# Patient Record
Sex: FEMALE | Race: WHITE | NOT HISPANIC OR LATINO | URBAN - METROPOLITAN AREA
[De-identification: names, ages, dates, MRNs, and addresses within clinical notes are randomized per-mention and may not be internally consistent; named-entity substitution may affect disease eponyms.]

---

## 2020-04-18 ENCOUNTER — EMERGENCY (EMERGENCY)
Facility: HOSPITAL | Age: 80
LOS: 0 days | Discharge: HOME | End: 2020-04-18
Attending: EMERGENCY MEDICINE | Admitting: EMERGENCY MEDICINE
Payer: MEDICARE

## 2020-04-18 VITALS
RESPIRATION RATE: 20 BRPM | OXYGEN SATURATION: 99 % | DIASTOLIC BLOOD PRESSURE: 68 MMHG | HEART RATE: 80 BPM | SYSTOLIC BLOOD PRESSURE: 149 MMHG

## 2020-04-18 VITALS
HEART RATE: 126 BPM | RESPIRATION RATE: 20 BRPM | SYSTOLIC BLOOD PRESSURE: 188 MMHG | TEMPERATURE: 99 F | DIASTOLIC BLOOD PRESSURE: 79 MMHG | OXYGEN SATURATION: 99 %

## 2020-04-18 DIAGNOSIS — I10 ESSENTIAL (PRIMARY) HYPERTENSION: ICD-10-CM

## 2020-04-18 DIAGNOSIS — R68.83 CHILLS (WITHOUT FEVER): ICD-10-CM

## 2020-04-18 DIAGNOSIS — R19.7 DIARRHEA, UNSPECIFIED: ICD-10-CM

## 2020-04-18 DIAGNOSIS — E11.9 TYPE 2 DIABETES MELLITUS WITHOUT COMPLICATIONS: ICD-10-CM

## 2020-04-18 DIAGNOSIS — R55 SYNCOPE AND COLLAPSE: ICD-10-CM

## 2020-04-18 DIAGNOSIS — E78.5 HYPERLIPIDEMIA, UNSPECIFIED: ICD-10-CM

## 2020-04-18 DIAGNOSIS — R10.9 UNSPECIFIED ABDOMINAL PAIN: ICD-10-CM

## 2020-04-18 LAB
ALBUMIN SERPL ELPH-MCNC: 4.8 G/DL — SIGNIFICANT CHANGE UP (ref 3.5–5.2)
ALP SERPL-CCNC: 46 U/L — SIGNIFICANT CHANGE UP (ref 30–115)
ALT FLD-CCNC: 7 U/L — SIGNIFICANT CHANGE UP (ref 0–41)
ANION GAP SERPL CALC-SCNC: 16 MMOL/L — HIGH (ref 7–14)
APPEARANCE UR: CLEAR — SIGNIFICANT CHANGE UP
AST SERPL-CCNC: 12 U/L — SIGNIFICANT CHANGE UP (ref 0–41)
BACTERIA # UR AUTO: ABNORMAL
BASE EXCESS BLDV CALC-SCNC: -7 MMOL/L — LOW (ref -2–2)
BASOPHILS # BLD AUTO: 0.04 K/UL — SIGNIFICANT CHANGE UP (ref 0–0.2)
BASOPHILS NFR BLD AUTO: 0.6 % — SIGNIFICANT CHANGE UP (ref 0–1)
BILIRUB DIRECT SERPL-MCNC: <0.2 MG/DL — SIGNIFICANT CHANGE UP (ref 0–0.2)
BILIRUB INDIRECT FLD-MCNC: 0 MG/DL — SIGNIFICANT CHANGE UP (ref 0.2–1.2)
BILIRUB SERPL-MCNC: <0.2 MG/DL — SIGNIFICANT CHANGE UP (ref 0.2–1.2)
BILIRUB UR-MCNC: NEGATIVE — SIGNIFICANT CHANGE UP
BUN SERPL-MCNC: 36 MG/DL — HIGH (ref 10–20)
CA-I SERPL-SCNC: 1.3 MMOL/L — SIGNIFICANT CHANGE UP (ref 1.12–1.3)
CALCIUM SERPL-MCNC: 10 MG/DL — SIGNIFICANT CHANGE UP (ref 8.5–10.1)
CHLORIDE SERPL-SCNC: 107 MMOL/L — SIGNIFICANT CHANGE UP (ref 98–110)
CO2 SERPL-SCNC: 16 MMOL/L — LOW (ref 17–32)
COLOR SPEC: YELLOW — SIGNIFICANT CHANGE UP
CREAT SERPL-MCNC: 1.3 MG/DL — SIGNIFICANT CHANGE UP (ref 0.7–1.5)
DIFF PNL FLD: ABNORMAL
EOSINOPHIL # BLD AUTO: 0.06 K/UL — SIGNIFICANT CHANGE UP (ref 0–0.7)
EOSINOPHIL NFR BLD AUTO: 0.9 % — SIGNIFICANT CHANGE UP (ref 0–8)
GAS PNL BLDV: 139 MMOL/L — SIGNIFICANT CHANGE UP (ref 136–145)
GAS PNL BLDV: SIGNIFICANT CHANGE UP
GLUCOSE SERPL-MCNC: 189 MG/DL — HIGH (ref 70–99)
GLUCOSE UR QL: NEGATIVE MG/DL — SIGNIFICANT CHANGE UP
GRAN CASTS # UR COMP ASSIST: ABNORMAL /LPF
HCO3 BLDV-SCNC: 19 MMOL/L — LOW (ref 22–29)
HCT VFR BLD CALC: 34.6 % — LOW (ref 37–47)
HCT VFR BLDA CALC: 34.9 % — SIGNIFICANT CHANGE UP (ref 34–44)
HGB BLD CALC-MCNC: 11.4 G/DL — LOW (ref 14–18)
HGB BLD-MCNC: 11.2 G/DL — LOW (ref 12–16)
HOROWITZ INDEX BLDV+IHG-RTO: 21 — SIGNIFICANT CHANGE UP
IMM GRANULOCYTES NFR BLD AUTO: 0.4 % — HIGH (ref 0.1–0.3)
KETONES UR-MCNC: ABNORMAL
LACTATE BLDV-MCNC: 1 MMOL/L — SIGNIFICANT CHANGE UP (ref 0.5–1.6)
LACTATE SERPL-SCNC: 1 MMOL/L — SIGNIFICANT CHANGE UP (ref 0.7–2)
LEUKOCYTE ESTERASE UR-ACNC: ABNORMAL
LIDOCAIN IGE QN: 52 U/L — SIGNIFICANT CHANGE UP (ref 7–60)
LYMPHOCYTES # BLD AUTO: 1.68 K/UL — SIGNIFICANT CHANGE UP (ref 1.2–3.4)
LYMPHOCYTES # BLD AUTO: 24.7 % — SIGNIFICANT CHANGE UP (ref 20.5–51.1)
MCHC RBC-ENTMCNC: 30 PG — SIGNIFICANT CHANGE UP (ref 27–31)
MCHC RBC-ENTMCNC: 32.4 G/DL — SIGNIFICANT CHANGE UP (ref 32–37)
MCV RBC AUTO: 92.8 FL — SIGNIFICANT CHANGE UP (ref 81–99)
MONOCYTES # BLD AUTO: 0.4 K/UL — SIGNIFICANT CHANGE UP (ref 0.1–0.6)
MONOCYTES NFR BLD AUTO: 5.9 % — SIGNIFICANT CHANGE UP (ref 1.7–9.3)
NEUTROPHILS # BLD AUTO: 4.58 K/UL — SIGNIFICANT CHANGE UP (ref 1.4–6.5)
NEUTROPHILS NFR BLD AUTO: 67.5 % — SIGNIFICANT CHANGE UP (ref 42.2–75.2)
NITRITE UR-MCNC: NEGATIVE — SIGNIFICANT CHANGE UP
NRBC # BLD: 0 /100 WBCS — SIGNIFICANT CHANGE UP (ref 0–0)
NT-PROBNP SERPL-SCNC: 675 PG/ML — HIGH (ref 0–300)
PCO2 BLDV: 38 MMHG — LOW (ref 41–51)
PH BLDV: 7.31 — SIGNIFICANT CHANGE UP (ref 7.26–7.43)
PH UR: 5.5 — SIGNIFICANT CHANGE UP (ref 5–8)
PLATELET # BLD AUTO: 254 K/UL — SIGNIFICANT CHANGE UP (ref 130–400)
PO2 BLDV: 37 MMHG — SIGNIFICANT CHANGE UP (ref 20–40)
POTASSIUM BLDV-SCNC: 4.8 MMOL/L — SIGNIFICANT CHANGE UP (ref 3.3–5.6)
POTASSIUM SERPL-MCNC: 4.8 MMOL/L — SIGNIFICANT CHANGE UP (ref 3.5–5)
POTASSIUM SERPL-SCNC: 4.8 MMOL/L — SIGNIFICANT CHANGE UP (ref 3.5–5)
PROT SERPL-MCNC: 7.9 G/DL — SIGNIFICANT CHANGE UP (ref 6–8)
PROT UR-MCNC: 100 MG/DL
RBC # BLD: 3.73 M/UL — LOW (ref 4.2–5.4)
RBC # FLD: 15 % — HIGH (ref 11.5–14.5)
RBC CASTS # UR COMP ASSIST: ABNORMAL /HPF
SAO2 % BLDV: 70 % — SIGNIFICANT CHANGE UP
SODIUM SERPL-SCNC: 139 MMOL/L — SIGNIFICANT CHANGE UP (ref 135–146)
SP GR SPEC: 1.02 — SIGNIFICANT CHANGE UP (ref 1.01–1.03)
TROPONIN T SERPL-MCNC: <0.01 NG/ML — SIGNIFICANT CHANGE UP
TROPONIN T SERPL-MCNC: <0.01 NG/ML — SIGNIFICANT CHANGE UP
UROBILINOGEN FLD QL: 0.2 MG/DL — SIGNIFICANT CHANGE UP (ref 0.2–0.2)
WBC # BLD: 6.79 K/UL — SIGNIFICANT CHANGE UP (ref 4.8–10.8)
WBC # FLD AUTO: 6.79 K/UL — SIGNIFICANT CHANGE UP (ref 4.8–10.8)
WBC UR QL: ABNORMAL /HPF

## 2020-04-18 PROCEDURE — 99285 EMERGENCY DEPT VISIT HI MDM: CPT

## 2020-04-18 PROCEDURE — 71045 X-RAY EXAM CHEST 1 VIEW: CPT | Mod: 26

## 2020-04-18 RX ORDER — AMLODIPINE BESYLATE 2.5 MG/1
5 TABLET ORAL ONCE
Refills: 0 | Status: COMPLETED | OUTPATIENT
Start: 2020-04-18 | End: 2020-04-18

## 2020-04-18 RX ORDER — LISINOPRIL 2.5 MG/1
5 TABLET ORAL ONCE
Refills: 0 | Status: COMPLETED | OUTPATIENT
Start: 2020-04-18 | End: 2020-04-18

## 2020-04-18 RX ORDER — METOPROLOL TARTRATE 50 MG
50 TABLET ORAL ONCE
Refills: 0 | Status: COMPLETED | OUTPATIENT
Start: 2020-04-18 | End: 2020-04-18

## 2020-04-18 RX ADMIN — AMLODIPINE BESYLATE 5 MILLIGRAM(S): 2.5 TABLET ORAL at 08:15

## 2020-04-18 RX ADMIN — LISINOPRIL 5 MILLIGRAM(S): 2.5 TABLET ORAL at 08:15

## 2020-04-18 RX ADMIN — Medication 50 MILLIGRAM(S): at 08:16

## 2020-04-18 NOTE — ED PROVIDER NOTE - NSFOLLOWUPINSTRUCTIONS_ED_ALL_ED_FT
-Follow up with your Primary Care Provider in 1-3 days  -Return to ED for worsening symptoms or concerns.    Hypertension  Hypertension, commonly called high blood pressure, is when the force of blood pumping through the arteries is too strong. The arteries are the blood vessels that carry blood from the heart throughout the body. Hypertension forces the heart to work harder to pump blood and may cause arteries to become narrow or stiff. Having untreated or uncontrolled hypertension can cause heart attacks, strokes, kidney disease, and other problems.    A blood pressure reading consists of a higher number over a lower number. Ideally, your blood pressure should be below 120/80. The first ("top") number is called the systolic pressure. It is a measure of the pressure in your arteries as your heart beats. The second ("bottom") number is called the diastolic pressure. It is a measure of the pressure in your arteries as the heart relaxes.    What are the causes?  The cause of this condition is not known.    What increases the risk?  Some risk factors for high blood pressure are under your control. Others are not.    Factors you can change     Smoking.  Having type 2 diabetes mellitus, high cholesterol, or both.  Not getting enough exercise or physical activity.  Being overweight.  Having too much fat, sugar, calories, or salt (sodium) in your diet.  Drinking too much alcohol.  Factors that are difficult or impossible to change     Having chronic kidney disease.  Having a family history of high blood pressure.  Age. Risk increases with age.  Race. You may be at higher risk if you are -American.  Gender. Men are at higher risk than women before age 45. After age 65, women are at higher risk than men.  Having obstructive sleep apnea.  Stress.  What are the signs or symptoms?  Extremely high blood pressure (hypertensive crisis) may cause:    Headache.  Anxiety.  Shortness of breath.  Nosebleed.  Nausea and vomiting.  Severe chest pain.  Jerky movements you cannot control (seizures).    How is this diagnosed?  This condition is diagnosed by measuring your blood pressure while you are seated, with your arm resting on a surface. The cuff of the blood pressure monitor will be placed directly against the skin of your upper arm at the level of your heart. It should be measured at least twice using the same arm. Certain conditions can cause a difference in blood pressure between your right and left arms.    Certain factors can cause blood pressure readings to be lower or higher than normal (elevated) for a short period of time:    When your blood pressure is higher when you are in a health care provider's office than when you are at home, this is called white coat hypertension. Most people with this condition do not need medicines.  When your blood pressure is higher at home than when you are in a health care provider's office, this is called masked hypertension. Most people with this condition may need medicines to control blood pressure.    If you have a high blood pressure reading during one visit or you have normal blood pressure with other risk factors:    You may be asked to return on a different day to have your blood pressure checked again.  You may be asked to monitor your blood pressure at home for 1 week or longer.    If you are diagnosed with hypertension, you may have other blood or imaging tests to help your health care provider understand your overall risk for other conditions.    How is this treated?  This condition is treated by making healthy lifestyle changes, such as eating healthy foods, exercising more, and reducing your alcohol intake. Your health care provider may prescribe medicine if lifestyle changes are not enough to get your blood pressure under control, and if:    Your systolic blood pressure is above 130.  Your diastolic blood pressure is above 80.    Your personal target blood pressure may vary depending on your medical conditions, your age, and other factors.    Follow these instructions at home:  Eating and drinking     Eat a diet that is high in fiber and potassium, and low in sodium, added sugar, and fat. An example eating plan is called the DASH (Dietary Approaches to Stop Hypertension) diet. To eat this way:    Eat plenty of fresh fruits and vegetables. Try to fill half of your plate at each meal with fruits and vegetables.  Eat whole grains, such as whole wheat pasta, brown rice, or whole grain bread. Fill about one quarter of your plate with whole grains.  Eat or drink low-fat dairy products, such as skim milk or low-fat yogurt.  Avoid fatty cuts of meat, processed or cured meats, and poultry with skin. Fill about one quarter of your plate with lean proteins, such as fish, chicken without skin, beans, eggs, and tofu.  Avoid premade and processed foods. These tend to be higher in sodium, added sugar, and fat.    Reduce your daily sodium intake. Most people with hypertension should eat less than 1,500 mg of sodium a day.  ImageLimit alcohol intake to no more than 1 drink a day for nonpregnant women and 2 drinks a day for men. One drink equals 12 oz of beer, 5 oz of wine, or 1½ oz of hard liquor.  Lifestyle     Work with your health care provider to maintain a healthy body weight or to lose weight. Ask what an ideal weight is for you.  Get at least 30 minutes of exercise that causes your heart to beat faster (aerobic exercise) most days of the week. Activities may include walking, swimming, or biking.  Include exercise to strengthen your muscles (resistance exercise), such as pilates or lifting weights, as part of your weekly exercise routine. Try to do these types of exercises for 30 minutes at least 3 days a week.  Do not use any products that contain nicotine or tobacco, such as cigarettes and e-cigarettes. If you need help quitting, ask your health care provider.  Monitor your blood pressure at home as told by your health care provider.  Keep all follow-up visits as told by your health care provider. This is important.  Medicines     Take over-the-counter and prescription medicines only as told by your health care provider. Follow directions carefully. Blood pressure medicines must be taken as prescribed.  Do not skip doses of blood pressure medicine. Doing this puts you at risk for problems and can make the medicine less effective.  Ask your health care provider about side effects or reactions to medicines that you should watch for.  Contact a health care provider if:  You think you are having a reaction to a medicine you are taking.  You have headaches that keep coming back (recurring).  You feel dizzy.  You have swelling in your ankles.  You have trouble with your vision.  Get help right away if:  You develop a severe headache or confusion.  You have unusual weakness or numbness.  You feel faint.  You have severe pain in your chest or abdomen.  You vomit repeatedly.  You have trouble breathing.  Summary  Hypertension is when the force of blood pumping through your arteries is too strong. If this condition is not controlled, it may put you at risk for serious complications.  Your personal target blood pressure may vary depending on your medical conditions, your age, and other factors. For most people, a normal blood pressure is less than 120/80.  Hypertension is treated with lifestyle changes, medicines, or a combination of both. Lifestyle changes include weight loss, eating a healthy, low-sodium diet, exercising more, and limiting alcohol.  This information is not intended to replace advice given to you by your health care provider. Make sure you discuss any questions you have with your health care provider.

## 2020-04-18 NOTE — ED PROVIDER NOTE - PROGRESS NOTE DETAILS
patient feeling better- states she is no longer having her abd pain, tolerating PO. she states she has no symptoms and was mainly coming in with concern for her bp being high.

## 2020-04-18 NOTE — ED ADULT NURSE NOTE - NSIMPLEMENTINTERV_GEN_ALL_ED
Implemented All Universal Safety Interventions:  [place] to call system. Call bell, personal items and telephone within reach. Instruct patient to call for assistance. Room bathroom lighting operational. Non-slip footwear when patient is off stretcher. Physically safe environment: no spills, clutter or unnecessary equipment. Stretcher in lowest position, wheels locked, appropriate side rails in place.

## 2020-04-18 NOTE — ED PROVIDER NOTE - ATTENDING CONTRIBUTION TO CARE
80y female above PMH with epigastric discomfort/diaphoresis, 1 week ago had nbnb vomiting and watery nb diarrhea with syncopal episode, did not seek care at that time, today denies cp, cough, or sob, did not take morning meds, on exam vital signs appreciated, well appearing, head nc/at, perrla, EOMI, conj pink op clear neck supple cor rrr lungs cta abd +bs, snt/nd no c/c/e pulses equal calves nontender neuro intact, will check ekg, labs, imaging, give morning meds and small fluid bolus, reassess

## 2020-04-18 NOTE — ED PROVIDER NOTE - PATIENT PORTAL LINK FT
You can access the FollowMyHealth Patient Portal offered by [facility] [place] by registering at the following website: [email or web address]. By joining [facility]’s FollowMyHealth portal, you will also be able to view your health information using other applications (apps) compatible with our system.

## 2020-04-18 NOTE — ED PROVIDER NOTE - OBJECTIVE STATEMENT
80 year old male hx HTN, HLD, DM presenting with abd pain. pain epigastric, non-radiating, no pall/prov factors. Son states patient had 1 episode of vomiting/diarrhea/syncope 7 days ago but has been feeling fine since then until last night. Admits to diaphoresis, chills but no measured temp, chest pain, shortness of breath, n/v/d, dysuria. No hx CAD.

## 2020-04-18 NOTE — ED PROVIDER NOTE - CARE PLAN
Principal Discharge DX:	Encounter for medical assessment  Secondary Diagnosis:	Chills  Secondary Diagnosis:	Asymptomatic hypertension

## 2020-04-18 NOTE — ED PROVIDER NOTE - CLINICAL SUMMARY MEDICAL DECISION MAKING FREE TEXT BOX
80y female above PMH with epigastric discomfort/diaphoresis, 1 week ago had nbnb vomiting and watery nb diarrhea with syncopal episode, did not seek care at that time, today denies cp, cough, or sob, did not take morning meds, on exam vital signs appreciated, well appearing, head nc/at, perrla, EOMI, conj pink op clear neck supple cor rrr lungs cta abd +bs, snt/nd no c/c/e pulses equal calves nontender neuro intact, labs and studies reviewed, pt feels well, vss, will d/c to f/u with pmd. Patient and son counseled regarding conditions which should prompt return.

## 2020-04-19 LAB
CULTURE RESULTS: SIGNIFICANT CHANGE UP
SPECIMEN SOURCE: SIGNIFICANT CHANGE UP

## 2023-08-07 ENCOUNTER — EMERGENCY (EMERGENCY)
Facility: HOSPITAL | Age: 83
LOS: 0 days | Discharge: HOME | End: 2023-08-07
Attending: EMERGENCY MEDICINE
Payer: MEDICARE

## 2023-08-07 VITALS
DIASTOLIC BLOOD PRESSURE: 65 MMHG | WEIGHT: 117.07 LBS | OXYGEN SATURATION: 96 % | SYSTOLIC BLOOD PRESSURE: 146 MMHG | TEMPERATURE: 98 F | HEART RATE: 80 BPM | RESPIRATION RATE: 20 BRPM

## 2023-08-07 VITALS
HEART RATE: 74 BPM | OXYGEN SATURATION: 96 % | SYSTOLIC BLOOD PRESSURE: 138 MMHG | TEMPERATURE: 98 F | RESPIRATION RATE: 18 BRPM | DIASTOLIC BLOOD PRESSURE: 76 MMHG

## 2023-08-07 DIAGNOSIS — E11.9 TYPE 2 DIABETES MELLITUS WITHOUT COMPLICATIONS: ICD-10-CM

## 2023-08-07 DIAGNOSIS — I10 ESSENTIAL (PRIMARY) HYPERTENSION: ICD-10-CM

## 2023-08-07 DIAGNOSIS — E78.5 HYPERLIPIDEMIA, UNSPECIFIED: ICD-10-CM

## 2023-08-07 DIAGNOSIS — D64.9 ANEMIA, UNSPECIFIED: ICD-10-CM

## 2023-08-07 LAB
ALBUMIN SERPL ELPH-MCNC: 4.6 G/DL — SIGNIFICANT CHANGE UP (ref 3.5–5.2)
ALP SERPL-CCNC: 97 U/L — SIGNIFICANT CHANGE UP (ref 30–115)
ALT FLD-CCNC: 12 U/L — SIGNIFICANT CHANGE UP (ref 0–41)
ANION GAP SERPL CALC-SCNC: 13 MMOL/L — SIGNIFICANT CHANGE UP (ref 7–14)
AST SERPL-CCNC: 14 U/L — SIGNIFICANT CHANGE UP (ref 0–41)
BASOPHILS # BLD AUTO: 0.06 K/UL — SIGNIFICANT CHANGE UP (ref 0–0.2)
BASOPHILS NFR BLD AUTO: 1.3 % — HIGH (ref 0–1)
BILIRUB SERPL-MCNC: <0.2 MG/DL — SIGNIFICANT CHANGE UP (ref 0.2–1.2)
BLD GP AB SCN SERPL QL: SIGNIFICANT CHANGE UP
BUN SERPL-MCNC: 49 MG/DL — HIGH (ref 10–20)
CALCIUM SERPL-MCNC: 9 MG/DL — SIGNIFICANT CHANGE UP (ref 8.4–10.5)
CHLORIDE SERPL-SCNC: 104 MMOL/L — SIGNIFICANT CHANGE UP (ref 98–110)
CO2 SERPL-SCNC: 20 MMOL/L — SIGNIFICANT CHANGE UP (ref 17–32)
CREAT SERPL-MCNC: 2.1 MG/DL — HIGH (ref 0.7–1.5)
EGFR: 23 ML/MIN/1.73M2 — LOW
EOSINOPHIL # BLD AUTO: 0.23 K/UL — SIGNIFICANT CHANGE UP (ref 0–0.7)
EOSINOPHIL NFR BLD AUTO: 4.8 % — SIGNIFICANT CHANGE UP (ref 0–8)
GLUCOSE SERPL-MCNC: 191 MG/DL — HIGH (ref 70–99)
HCT VFR BLD CALC: 29.4 % — LOW (ref 37–47)
HGB BLD-MCNC: 8.8 G/DL — LOW (ref 12–16)
IMM GRANULOCYTES NFR BLD AUTO: 0.2 % — SIGNIFICANT CHANGE UP (ref 0.1–0.3)
LYMPHOCYTES # BLD AUTO: 1.4 K/UL — SIGNIFICANT CHANGE UP (ref 1.2–3.4)
LYMPHOCYTES # BLD AUTO: 29.3 % — SIGNIFICANT CHANGE UP (ref 20.5–51.1)
MCHC RBC-ENTMCNC: 25.8 PG — LOW (ref 27–31)
MCHC RBC-ENTMCNC: 29.9 G/DL — LOW (ref 32–37)
MCV RBC AUTO: 86.2 FL — SIGNIFICANT CHANGE UP (ref 81–99)
MONOCYTES # BLD AUTO: 0.6 K/UL — SIGNIFICANT CHANGE UP (ref 0.1–0.6)
MONOCYTES NFR BLD AUTO: 12.6 % — HIGH (ref 1.7–9.3)
NEUTROPHILS # BLD AUTO: 2.48 K/UL — SIGNIFICANT CHANGE UP (ref 1.4–6.5)
NEUTROPHILS NFR BLD AUTO: 51.8 % — SIGNIFICANT CHANGE UP (ref 42.2–75.2)
NRBC # BLD: 0 /100 WBCS — SIGNIFICANT CHANGE UP (ref 0–0)
PLATELET # BLD AUTO: 337 K/UL — SIGNIFICANT CHANGE UP (ref 130–400)
PMV BLD: 9.4 FL — SIGNIFICANT CHANGE UP (ref 7.4–10.4)
POTASSIUM SERPL-MCNC: 5.2 MMOL/L — HIGH (ref 3.5–5)
POTASSIUM SERPL-SCNC: 5.2 MMOL/L — HIGH (ref 3.5–5)
PROT SERPL-MCNC: 8.1 G/DL — HIGH (ref 6–8)
RBC # BLD: 3.41 M/UL — LOW (ref 4.2–5.4)
RBC # FLD: 15.1 % — HIGH (ref 11.5–14.5)
SODIUM SERPL-SCNC: 137 MMOL/L — SIGNIFICANT CHANGE UP (ref 135–146)
WBC # BLD: 4.78 K/UL — LOW (ref 4.8–10.8)
WBC # FLD AUTO: 4.78 K/UL — LOW (ref 4.8–10.8)

## 2023-08-07 PROCEDURE — 80053 COMPREHEN METABOLIC PANEL: CPT

## 2023-08-07 PROCEDURE — 85025 COMPLETE CBC W/AUTO DIFF WBC: CPT

## 2023-08-07 PROCEDURE — 36415 COLL VENOUS BLD VENIPUNCTURE: CPT

## 2023-08-07 PROCEDURE — 86901 BLOOD TYPING SEROLOGIC RH(D): CPT

## 2023-08-07 PROCEDURE — 99283 EMERGENCY DEPT VISIT LOW MDM: CPT

## 2023-08-07 PROCEDURE — 86850 RBC ANTIBODY SCREEN: CPT

## 2023-08-07 PROCEDURE — 99284 EMERGENCY DEPT VISIT MOD MDM: CPT | Mod: FS

## 2023-08-07 PROCEDURE — 86900 BLOOD TYPING SEROLOGIC ABO: CPT

## 2023-08-07 NOTE — ED ADULT NURSE NOTE - DRUG PRE-SCREENING (DAST -1)
Diabetes Support Resources:  Fill reservoir to 1.50 ml  Change out your site 3 days  Place the G5 sensor on    I will call you with the G6 sensor     Bring blood glucose meter and logbook with you to all doctor and follow-up appointments.    Diabetes Education Telephone Visit Follow-up:    We realize your time is valuable and your health is important! We offer a convenient Telephone Visit follow up! It s a quick way to check in for a medication dose adjustment without having to come back to clinic as soon.    Telephone Visits are often covered by insurance. Please check with your insurance plan to see if this type of visit is covered. If not, the cost is less expensive than an office visit:      Up to 10 minutes (Code 29007): $30    11-20 minutes (Code 59276): $59    More than 20 minutes (Code 30442): $85    Talk with your Diabetes Educator if you want to learn more.      [facility]:  For Your Diabetes Education and Nutrition Appointments Call:  [phone]   For Diabetes Education or Nutrition Related Questions:   Phone: [phone]  E-mail: [email or web address]  Fax: [phone]   If you need a medication refill please contact your pharmacy. Please allow 3 business days for your refills to be completed.    
Statement Selected

## 2023-08-07 NOTE — ED PROVIDER NOTE - OBJECTIVE STATEMENT
this is a 84 yo female presents to ed for possible blood transfusion, patient has been diagnosed with anemia. patient was going for infusions. patient went today for infusion and was told to come to ed because blood count was 6.4

## 2023-08-07 NOTE — ED PROVIDER NOTE - PATIENT PORTAL LINK FT
You can access the FollowMyHealth Patient Portal offered by [facility] by registering at the following website: [email or web address]. By joining [facility]’s FollowMyHealth portal, you will also be able to view your health information using other applications (apps) compatible with our system.

## 2023-08-07 NOTE — ED ADULT NURSE NOTE - NSFALLHARMRISKINTERV_ED_ALL_ED

## 2023-08-07 NOTE — ED PROVIDER NOTE - CLINICAL SUMMARY MEDICAL DECISION MAKING FREE TEXT BOX
83-year-old female with anemia presented for some sort of infusion but was found with low hemoglobin on point-of-care test in office and sent to ED for transfusion, patient has no complaints, exam as above, hemoglobin 8.8 which is baseline, will discharge

## 2023-08-07 NOTE — ED PROVIDER NOTE - NS ED ATTENDING STATEMENT MOD
This was a shared visit with the VEDA. I reviewed and verified the documentation and independently performed the documented:

## 2023-08-08 PROBLEM — E11.9 TYPE 2 DIABETES MELLITUS WITHOUT COMPLICATIONS: Chronic | Status: ACTIVE | Noted: 2020-04-18

## 2023-08-08 PROBLEM — I10 ESSENTIAL (PRIMARY) HYPERTENSION: Chronic | Status: ACTIVE | Noted: 2020-04-18

## 2023-08-08 PROBLEM — E78.5 HYPERLIPIDEMIA, UNSPECIFIED: Chronic | Status: ACTIVE | Noted: 2020-04-18

## 2024-03-09 ENCOUNTER — INPATIENT (INPATIENT)
Facility: HOSPITAL | Age: 84
LOS: 2 days | Discharge: HOME | DRG: 313 | End: 2024-03-12
Attending: HOSPITALIST | Admitting: INTERNAL MEDICINE
Payer: MEDICARE

## 2024-03-09 VITALS
SYSTOLIC BLOOD PRESSURE: 188 MMHG | TEMPERATURE: 98 F | DIASTOLIC BLOOD PRESSURE: 66 MMHG | OXYGEN SATURATION: 96 % | HEART RATE: 108 BPM | HEIGHT: 62 IN | RESPIRATION RATE: 18 BRPM | WEIGHT: 117.95 LBS

## 2024-03-09 DIAGNOSIS — R07.9 CHEST PAIN, UNSPECIFIED: ICD-10-CM

## 2024-03-09 LAB
ALBUMIN SERPL ELPH-MCNC: 4.4 G/DL — SIGNIFICANT CHANGE UP (ref 3.5–5.2)
ALP SERPL-CCNC: 69 U/L — SIGNIFICANT CHANGE UP (ref 30–115)
ALT FLD-CCNC: 9 U/L — SIGNIFICANT CHANGE UP (ref 0–41)
ANION GAP SERPL CALC-SCNC: 13 MMOL/L — SIGNIFICANT CHANGE UP (ref 7–14)
APTT BLD: 31.8 SEC — SIGNIFICANT CHANGE UP (ref 27–39.2)
AST SERPL-CCNC: 17 U/L — SIGNIFICANT CHANGE UP (ref 0–41)
BASOPHILS # BLD AUTO: 0.04 K/UL — SIGNIFICANT CHANGE UP (ref 0–0.2)
BASOPHILS NFR BLD AUTO: 0.4 % — SIGNIFICANT CHANGE UP (ref 0–1)
BILIRUB SERPL-MCNC: 0.2 MG/DL — SIGNIFICANT CHANGE UP (ref 0.2–1.2)
BUN SERPL-MCNC: 54 MG/DL — HIGH (ref 10–20)
CALCIUM SERPL-MCNC: 9.3 MG/DL — SIGNIFICANT CHANGE UP (ref 8.4–10.5)
CHLORIDE SERPL-SCNC: 102 MMOL/L — SIGNIFICANT CHANGE UP (ref 98–110)
CO2 SERPL-SCNC: 22 MMOL/L — SIGNIFICANT CHANGE UP (ref 17–32)
CREAT SERPL-MCNC: 2.4 MG/DL — HIGH (ref 0.7–1.5)
EGFR: 19 ML/MIN/1.73M2 — LOW
EOSINOPHIL # BLD AUTO: 0.06 K/UL — SIGNIFICANT CHANGE UP (ref 0–0.7)
EOSINOPHIL NFR BLD AUTO: 0.6 % — SIGNIFICANT CHANGE UP (ref 0–8)
FLUAV AG NPH QL: SIGNIFICANT CHANGE UP
FLUBV AG NPH QL: SIGNIFICANT CHANGE UP
GLUCOSE BLDC GLUCOMTR-MCNC: 153 MG/DL — HIGH (ref 70–99)
GLUCOSE BLDC GLUCOMTR-MCNC: 176 MG/DL — HIGH (ref 70–99)
GLUCOSE BLDC GLUCOMTR-MCNC: 181 MG/DL — HIGH (ref 70–99)
GLUCOSE SERPL-MCNC: 138 MG/DL — HIGH (ref 70–99)
HCT VFR BLD CALC: 32 % — LOW (ref 37–47)
HGB BLD-MCNC: 10.8 G/DL — LOW (ref 12–16)
IMM GRANULOCYTES NFR BLD AUTO: 0.3 % — SIGNIFICANT CHANGE UP (ref 0.1–0.3)
INR BLD: 0.93 RATIO — SIGNIFICANT CHANGE UP (ref 0.65–1.3)
LYMPHOCYTES # BLD AUTO: 1.02 K/UL — LOW (ref 1.2–3.4)
LYMPHOCYTES # BLD AUTO: 10 % — LOW (ref 20.5–51.1)
MCHC RBC-ENTMCNC: 29.3 PG — SIGNIFICANT CHANGE UP (ref 27–31)
MCHC RBC-ENTMCNC: 33.8 G/DL — SIGNIFICANT CHANGE UP (ref 32–37)
MCV RBC AUTO: 87 FL — SIGNIFICANT CHANGE UP (ref 81–99)
MONOCYTES # BLD AUTO: 1.08 K/UL — HIGH (ref 0.1–0.6)
MONOCYTES NFR BLD AUTO: 10.6 % — HIGH (ref 1.7–9.3)
NEUTROPHILS # BLD AUTO: 7.95 K/UL — HIGH (ref 1.4–6.5)
NEUTROPHILS NFR BLD AUTO: 78.1 % — HIGH (ref 42.2–75.2)
NRBC # BLD: 0 /100 WBCS — SIGNIFICANT CHANGE UP (ref 0–0)
NT-PROBNP SERPL-SCNC: 6913 PG/ML — HIGH (ref 0–300)
PLATELET # BLD AUTO: 210 K/UL — SIGNIFICANT CHANGE UP (ref 130–400)
PMV BLD: 10 FL — SIGNIFICANT CHANGE UP (ref 7.4–10.4)
POTASSIUM SERPL-MCNC: 4.8 MMOL/L — SIGNIFICANT CHANGE UP (ref 3.5–5)
POTASSIUM SERPL-SCNC: 4.8 MMOL/L — SIGNIFICANT CHANGE UP (ref 3.5–5)
PROT SERPL-MCNC: 7.9 G/DL — SIGNIFICANT CHANGE UP (ref 6–8)
PROTHROM AB SERPL-ACNC: 10.6 SEC — SIGNIFICANT CHANGE UP (ref 9.95–12.87)
RBC # BLD: 3.68 M/UL — LOW (ref 4.2–5.4)
RBC # FLD: 17.2 % — HIGH (ref 11.5–14.5)
RSV RNA NPH QL NAA+NON-PROBE: SIGNIFICANT CHANGE UP
SARS-COV-2 RNA SPEC QL NAA+PROBE: SIGNIFICANT CHANGE UP
SODIUM SERPL-SCNC: 137 MMOL/L — SIGNIFICANT CHANGE UP (ref 135–146)
TROPONIN SAMPLING TIME: SIGNIFICANT CHANGE UP
TROPONIN T, HIGH SENSITIVITY RESULT: 36 NG/L — HIGH (ref 6–13)
TROPONIN T, HIGH SENSITIVITY RESULT: 36 NG/L — HIGH (ref 6–13)
TROPONIN T, HIGH SENSITIVITY RESULT: 40 NG/L — HIGH (ref 6–13)
TROPONIN T, HIGH SENSITIVITY RESULT: 45 NG/L — HIGH (ref 6–13)
WBC # BLD: 10.18 K/UL — SIGNIFICANT CHANGE UP (ref 4.8–10.8)
WBC # FLD AUTO: 10.18 K/UL — SIGNIFICANT CHANGE UP (ref 4.8–10.8)

## 2024-03-09 PROCEDURE — 78452 HT MUSCLE IMAGE SPECT MULT: CPT | Mod: MC

## 2024-03-09 PROCEDURE — 80061 LIPID PANEL: CPT

## 2024-03-09 PROCEDURE — 82728 ASSAY OF FERRITIN: CPT

## 2024-03-09 PROCEDURE — 85025 COMPLETE CBC W/AUTO DIFF WBC: CPT

## 2024-03-09 PROCEDURE — 84100 ASSAY OF PHOSPHORUS: CPT

## 2024-03-09 PROCEDURE — 83540 ASSAY OF IRON: CPT

## 2024-03-09 PROCEDURE — 71045 X-RAY EXAM CHEST 1 VIEW: CPT | Mod: 26

## 2024-03-09 PROCEDURE — 81001 URINALYSIS AUTO W/SCOPE: CPT

## 2024-03-09 PROCEDURE — 83550 IRON BINDING TEST: CPT

## 2024-03-09 PROCEDURE — 82962 GLUCOSE BLOOD TEST: CPT

## 2024-03-09 PROCEDURE — 83735 ASSAY OF MAGNESIUM: CPT

## 2024-03-09 PROCEDURE — 80048 BASIC METABOLIC PNL TOTAL CA: CPT

## 2024-03-09 PROCEDURE — 83036 HEMOGLOBIN GLYCOSYLATED A1C: CPT

## 2024-03-09 PROCEDURE — A9500: CPT

## 2024-03-09 PROCEDURE — 84484 ASSAY OF TROPONIN QUANT: CPT

## 2024-03-09 PROCEDURE — 99285 EMERGENCY DEPT VISIT HI MDM: CPT | Mod: FS

## 2024-03-09 PROCEDURE — 85027 COMPLETE CBC AUTOMATED: CPT

## 2024-03-09 PROCEDURE — 93306 TTE W/DOPPLER COMPLETE: CPT

## 2024-03-09 PROCEDURE — 36415 COLL VENOUS BLD VENIPUNCTURE: CPT

## 2024-03-09 RX ORDER — METOPROLOL TARTRATE 50 MG
50 TABLET ORAL
Refills: 0 | Status: DISCONTINUED | OUTPATIENT
Start: 2024-03-09 | End: 2024-03-12

## 2024-03-09 RX ORDER — ONDANSETRON 8 MG/1
4 TABLET, FILM COATED ORAL ONCE
Refills: 0 | Status: COMPLETED | OUTPATIENT
Start: 2024-03-09 | End: 2024-03-09

## 2024-03-09 RX ORDER — LANOLIN ALCOHOL/MO/W.PET/CERES
3 CREAM (GRAM) TOPICAL AT BEDTIME
Refills: 0 | Status: DISCONTINUED | OUTPATIENT
Start: 2024-03-09 | End: 2024-03-12

## 2024-03-09 RX ORDER — AMLODIPINE BESYLATE 2.5 MG/1
5 TABLET ORAL DAILY
Refills: 0 | Status: DISCONTINUED | OUTPATIENT
Start: 2024-03-09 | End: 2024-03-12

## 2024-03-09 RX ORDER — HEPARIN SODIUM 5000 [USP'U]/ML
5000 INJECTION INTRAVENOUS; SUBCUTANEOUS EVERY 8 HOURS
Refills: 0 | Status: DISCONTINUED | OUTPATIENT
Start: 2024-03-09 | End: 2024-03-12

## 2024-03-09 RX ORDER — INSULIN LISPRO 100/ML
VIAL (ML) SUBCUTANEOUS
Refills: 0 | Status: DISCONTINUED | OUTPATIENT
Start: 2024-03-09 | End: 2024-03-12

## 2024-03-09 RX ORDER — SODIUM BICARBONATE 1 MEQ/ML
650 SYRINGE (ML) INTRAVENOUS
Refills: 0 | Status: DISCONTINUED | OUTPATIENT
Start: 2024-03-09 | End: 2024-03-12

## 2024-03-09 RX ORDER — ONDANSETRON 8 MG/1
4 TABLET, FILM COATED ORAL EVERY 8 HOURS
Refills: 0 | Status: DISCONTINUED | OUTPATIENT
Start: 2024-03-09 | End: 2024-03-12

## 2024-03-09 RX ORDER — METOPROLOL TARTRATE 50 MG
1 TABLET ORAL
Refills: 0 | DISCHARGE

## 2024-03-09 RX ORDER — INSULIN GLARGINE 100 [IU]/ML
10 INJECTION, SOLUTION SUBCUTANEOUS AT BEDTIME
Refills: 0 | Status: DISCONTINUED | OUTPATIENT
Start: 2024-03-09 | End: 2024-03-12

## 2024-03-09 RX ORDER — INSULIN GLARGINE 100 [IU]/ML
10 INJECTION, SOLUTION SUBCUTANEOUS
Refills: 0 | DISCHARGE

## 2024-03-09 RX ORDER — SODIUM BICARBONATE 1 MEQ/ML
1 SYRINGE (ML) INTRAVENOUS
Refills: 0 | DISCHARGE

## 2024-03-09 RX ORDER — DOXAZOSIN MESYLATE 4 MG
1 TABLET ORAL DAILY
Refills: 0 | Status: DISCONTINUED | OUTPATIENT
Start: 2024-03-09 | End: 2024-03-12

## 2024-03-09 RX ORDER — ACETAMINOPHEN 500 MG
650 TABLET ORAL EVERY 6 HOURS
Refills: 0 | Status: DISCONTINUED | OUTPATIENT
Start: 2024-03-09 | End: 2024-03-12

## 2024-03-09 RX ORDER — ASPIRIN/CALCIUM CARB/MAGNESIUM 324 MG
324 TABLET ORAL ONCE
Refills: 0 | Status: COMPLETED | OUTPATIENT
Start: 2024-03-09 | End: 2024-03-09

## 2024-03-09 RX ORDER — SIMVASTATIN 20 MG/1
20 TABLET, FILM COATED ORAL AT BEDTIME
Refills: 0 | Status: DISCONTINUED | OUTPATIENT
Start: 2024-03-09 | End: 2024-03-12

## 2024-03-09 RX ADMIN — Medication 1: at 17:08

## 2024-03-09 RX ADMIN — AMLODIPINE BESYLATE 5 MILLIGRAM(S): 2.5 TABLET ORAL at 12:03

## 2024-03-09 RX ADMIN — Medication 650 MILLIGRAM(S): at 17:45

## 2024-03-09 RX ADMIN — INSULIN GLARGINE 10 UNIT(S): 100 INJECTION, SOLUTION SUBCUTANEOUS at 21:09

## 2024-03-09 RX ADMIN — HEPARIN SODIUM 5000 UNIT(S): 5000 INJECTION INTRAVENOUS; SUBCUTANEOUS at 21:08

## 2024-03-09 RX ADMIN — ONDANSETRON 4 MILLIGRAM(S): 8 TABLET, FILM COATED ORAL at 07:50

## 2024-03-09 RX ADMIN — Medication 50 MILLIGRAM(S): at 17:45

## 2024-03-09 RX ADMIN — Medication 324 MILLIGRAM(S): at 07:51

## 2024-03-09 RX ADMIN — HEPARIN SODIUM 5000 UNIT(S): 5000 INJECTION INTRAVENOUS; SUBCUTANEOUS at 17:09

## 2024-03-09 RX ADMIN — SIMVASTATIN 20 MILLIGRAM(S): 20 TABLET, FILM COATED ORAL at 21:09

## 2024-03-09 NOTE — H&P ADULT - NSHPLABSRESULTS_GEN_ALL_CORE
10.8   10.18 )-----------( 210      ( 09 Mar 2024 07:40 )             32.0       03-09    137  |  102  |  54<H>  ----------------------------<  138<H>  4.8   |  22  |  2.4<H>    Ca    9.3      09 Mar 2024 07:40    TPro  7.9  /  Alb  4.4  /  TBili  0.2  /  DBili  x   /  AST  17  /  ALT  9   /  AlkPhos  69  03-09              Urinalysis Basic - ( 09 Mar 2024 07:40 )    Color: x / Appearance: x / SG: x / pH: x  Gluc: 138 mg/dL / Ketone: x  / Bili: x / Urobili: x   Blood: x / Protein: x / Nitrite: x   Leuk Esterase: x / RBC: x / WBC x   Sq Epi: x / Non Sq Epi: x / Bacteria: x        PT/INR - ( 09 Mar 2024 07:40 )   PT: 10.60 sec;   INR: 0.93 ratio         PTT - ( 09 Mar 2024 07:40 )  PTT:31.8 sec      EKG 3/9:   Ventricular Rate 95 BPM    Atrial Rate 95 BPM    P-R Interval 164 ms    QRS Duration 80 ms    Q-T Interval 332 ms    QTC Calculation(Bazett) 417 ms    P Axis 76 degrees    R Axis 9 degrees    T Axis 61 degrees    Diagnosis Line Normal sinus rhythm  Normal ECG  Confirmed by DRE MORRIS, ASA ([id number]) on 3/9/2024 9:08:56 AM    CXR 3/9: Pending official read

## 2024-03-09 NOTE — H&P ADULT - HISTORY OF PRESENT ILLNESS
This is an 83 yo female with PMHx of HTN, HLD, DM presenting to the ED for chest pain. Patient's son reports pain started at midnight in left shoulder, describes pain is persistent, aching, nonradiating. Patient admits to associated SOB and orthopnea, and states pain is alleviated when sitting/standing up.  Patient also reports mild dull HA, 5/10. Patient currently reports chest pain has significantly improved.  Denies numbness/tingling, fever, chills, abdominal pain, dysuria, V/D, palpitations, lightheadedness, dizziness, weakness at this time.  This is an 83 yo female with PMHx of HTN, HLD, DM, CKD4 presenting to the ED for chest pain. Patient's son reports pain started at midnight in left shoulder, describes pain is persistent, aching, nonradiating. Patient admits to associated SOB and orthopnea, and states pain is alleviated when sitting/standing up.  Patient also reports mild dull HA, 5/10. Patient currently reports chest pain has significantly improved.  Denies numbness/tingling, fever, chills, abdominal pain, dysuria, V/D, palpitations, lightheadedness, dizziness, weakness at this time.

## 2024-03-09 NOTE — CONSULT NOTE ADULT - NS ATTEND AMEND GEN_ALL_CORE FT
This is an 83 yo female with PMHx of HTN, HLD, DM, CKD4 presenting to the ED for chest pain. Pain at rest. No exertional pain. No pain now. Will do a Lexiscan

## 2024-03-09 NOTE — ED PROVIDER NOTE - CLINICAL SUMMARY MEDICAL DECISION MAKING FREE TEXT BOX
Pt presents with chest pain. Associated with shortness of breath. While in the ED pt felt better. Troponin is elevated. Admitted to Tele for ACS.

## 2024-03-09 NOTE — H&P ADULT - NSHPOUTPATIENTPROVIDERS_GEN_ALL_CORE
Nephrologist: Dr.Namala Funez   PCP: Dr.Natalia Quiñones   Hematologist: Dr.Rumana Owens Nephrologist: Dr.Namala Funez   PCP: Dr.Natalya Farmer   Hematologist: Dr.Rumana Owens

## 2024-03-09 NOTE — H&P ADULT - ASSESSMENT
This is an 85 yo female with PMHx of HTN, HLD, DM presenting to the ED for chest pain.    Plan:   #Chest Pain- R/O ACS  -Admit to medicine   -s/p  x1 in ED   -EKG: NSR, no ischemic changes   -Trend tronopin 36>36   -F/U echo   -Supplemental oxygen if needed     #HTN/HLD  -C/w home amlodipine, simvastatin, metoprolol, cardura    #DM2  -ISS while inpatient  -home Basaglar 10 u qd   -holding home Pioglitazone     Above plan discussed with Dr. Callahan          This is an 85 yo female with PMHx of HTN, HLD, DM presenting to the ED for chest pain.    Plan:   #Chest Pain- R/O ACS  -Admit to medicine   -s/p  x1 in ED   -EKG: NSR, no ischemic changes   -Trend tronopin 36>36   -F/U echo   -Supplemental oxygen if needed     #HTN/HLD  -C/w home amlodipine, simvastatin, metoprolol, cardura    #DM2  -ISS while inpatient  -home Basaglar 10 u qd   -holding home Pioglitazone     #CKD  -c/w home sodium bicarb     #Diet:DASH  #Activity: Ambulate with assistance   #DVT ppx: HSQ     Above plan discussed with Dr. Callahan          This is an 83 yo female with PMHx of HTN, HLD, DM, CKD presenting to the ED for chest pain.    Plan:   #Chest Pain- R/O ACS  -Admit to medicine   -s/p  x1 in ED   -EKG: NSR, no ischemic changes   -Trend tronopin 36>36   -F/U echo   -F/U cardiology cs   -Supplemental oxygen prn   -Pain control prn     #HTN/HLD  -C/w home amlodipine, simvastatin, metoprolol, cardura    #DM2  -ISS while inpatient  -home Basaglar 10 u qd   -holding home Pioglitazone     #CKD4  -c/w sodium bicarb     #Diet:DASH  #Activity: OOB with assistance   #DVT ppx: HSQ     Above plan discussed with Dr. Callahan          This is an 85 yo female with PMHx of HTN, HLD, DM, CKD4 presenting to the ED for chest pain.    Plan:   #Chest Pain- R/O ACS  -Admit to medicine   -s/p  x1 in ED   -EKG: NSR, no ischemic changes   -Trend tronopin 36>36   -F/U echo   -F/U cardiology cs   -Supplemental oxygen prn   -Pain control prn     #HTN/HLD  -C/w home amlodipine, simvastatin, metoprolol, cardura    #DM2  -ISS while inpatient  -home Basaglar 10 u qd   -holding home Pioglitazone     #CKD4  -c/w sodium bicarb     #Diet:DASH  #Activity: OOB with assistance   #DVT ppx: HSQ     Above plan discussed with Dr. Callahan

## 2024-03-09 NOTE — ED ADULT NURSE NOTE - NSICDXPASTMEDICALHX_GEN_ALL_CORE_FT
PAST MEDICAL HISTORY:  DM (diabetes mellitus)     HLD (hyperlipidemia)     HTN (hypertension)     HTN (hypertension)

## 2024-03-09 NOTE — PATIENT PROFILE ADULT - FUNCTIONAL ASSESSMENT - BASIC MOBILITY 2.
Internal Medicine Daily Progress Note    Patient: Gabo Cruz 76 year old male Date: 2/22/2018   YOB: 1942 Attending: Julian Mireles MD     Chief Complaint: lightheadedness     Subjective:   Had thoracentesis with 1400cc removed yesterday. No dyspnea. No chest pain or palpations, no edema.     Review of Systems: A 12 point review of systems is otherwise negative    Medications :  Scheduled  • warfarin  4 mg Oral Once   • furosemide  40 mg Oral BID   • colchicine  0.6 mg Oral Daily   • WARFARIN - PHARMACIST MONITORED   Does not apply See Admin Instructions   • amiodarone  200 mg Oral 2 times per day   • atorvastatin  40 mg Oral Nightly   • aspirin  81 mg Oral Daily   • famotidine  20 mg Oral Daily   • potassium chloride  20 mEq Oral Daily   • sodium chloride (PF)  2 mL Injection 2 times per day   • enoxaparin (LOVENOX) injection  40 mg Subcutaneous Nightly   • pneumococcal 23-valent vaccine  0.5 mL Intramuscular Once       PRN  lidocaine, sodium chloride (PF), sodium chloride, nitroGLYcerin, potassium chloride, potassium chloride, potassium chloride, potassium chloride, potassium chloride, potassium chloride, sodium chloride, magnesium sulfate, magnesium sulfate, magnesium sulfate, acetaminophen    Continuous infusion      Allergies:  ALLERGIES:   Allergen Reactions   • Simvastatin PRURITUS     itching   • Codeine Other (See Comments)     Chest pain (cough medicine)   • Eliquis [Apixaban] DIZZINESS     lightheadedness       Physical Examination :  Vital 24 Hour Range Most Recent Value   Temperature Temp  Min: 97.2 °F (36.2 °C)  Max: 98.2 °F (36.8 °C) 97.4 °F (36.3 °C)   Pulse Pulse  Min: 81  Max: 104 95   Respiratory Resp  Min: 18  Max: 20 18   Blood Pressure BP  Min: 119/64  Max: 148/96 119/64   Pulse Oximetry SpO2  Min: 96 %  Max: 100 %    O2 No Data Recorded    Intake/Output:     Intake/Output Summary (Last 24 hours) at 02/22/18 1124  Last data filed at 02/22/18 1100   Gross per 24 hour    Intake             1200 ml   Output             2900 ml   Net            -1700 ml     Constitutional: NAD  HENT: MMM  Neck: Supple   Respiratory: diminished breath sounds at both bases. On RA  Chest: Sternotomy incision well healed.   Cardiovascular: RRR. No murmurs, rubs or gallops.   GI:  Soft, nondistended, normal bowel sounds, nontender  Musculoskeletal:  No tenderness nor deformities.  Skin:  No rash  Neurologic:  Alert & oriented x 3, no focal deficits noted   Extremities: No clubbing, cyanosis, nor edema    Laboratory Results:  Recent Labs      02/19/18   1130  02/20/18   0420  02/20/18   0940  02/21/18   0415  02/22/18   0415   WBC  9.6  6.8   --   7.8  7.2   RBC  3.67*  3.32*   --   3.31*  3.33*   HGB  11.2*  10.1*   --   9.9*  10.1*   HCT  36.3*  32.7*   --   32.5*  32.9*   PLT  243  220   --   214  222   PTT  29   --    --   30  32*   PT  15.1*   --   13.7*  16.0*  18.6*   INR  1.5   --   1.3  1.6  1.8   SODIUM  138  140   --   140  138   POTASSIUM  4.8  4.7   --   4.8  4.6   CHLORIDE  105  109*   --   108*  104   CO2  25  25   --   25  28   BUN  34*  28*   --   33*  33*   CREATININE  1.14  1.08   --   1.11  1.15   GFRNA  61  65   --   63  61   GLUCOSE  103*  86   --   99  93   CALCIUM  8.2*  7.9*   --   7.9*  8.2*   MG  2.3  2.4   --   2.5*  2.5*   AST  31  26   --    --    --    GPT  36  33   --    --    --    ALKPT  101  88   --    --    --    BILIRUBIN  0.6  0.6   --    --    --    ALBUMIN  2.6*  2.3*   --    --    --    RAPDTR  0.04   --    --    --    --        Imaging Results Reviewed:  No results found.    Assessment and Plan:  1. Recurrent right sided pleural effusion  2. Paroxysmal atrial fibrillation   3. Status post aortic valve replacement   4. Recent influenza, completing oseltamivir   5. Pseudomonas UTI with indwelling thomas catheter.   6. CAD s/p CABG in 2015  7. Ischemic cardiomyopathy. EF 32% on 2/19/18. Status post ICD.     Plan:   Status post 1.5L of transudative fluid removed via  thoracentesis on 2/20  Discussed with Dr. Barnes and CV surgery team. CXR today reviewed and he is reaccumulating fluids again, will have to place pleurx catheter. Discussed with patient   Continue colchicine  Continue increased Lasix   Repeat CXR in AM  PT/OT - PT pending, OT recommends home.   Have completed course of Cipro and Tamiflu as of today   Continue to monitor BP while holding Lisinopril, Tamsulosin, Metoprolol   Continue ASA, Amiodarone, Lasix, and Atorvastatin   Pharmacy to dose Warfarin, INR 1.8 today.   Pulmonary and CV surgery following   Hinojosa catheter removed this morning. Trend PVRs. S/p Keflex x 1    DVT/VTE Prophylaxis:  VTE Pharmacologic Prophylaxis: Yes  VTE Mechanical Prophylaxis: Yes    Code Status: Full Resuscitation    Discharge:  [facility] 2/22/18  Barriers: clinical condition   Destination: home     Julian Mireles MD  [facility] Hospitalist  Pager [phone]  Please contact the attending Hospitalist from 7AM until 7pm.   From 7pm to 7am please contact the Hospitalist on call     4 = No assist / stand by assistance

## 2024-03-09 NOTE — PATIENT PROFILE ADULT - FUNCTIONAL ASSESSMENT - DAILY ACTIVITY SCORE.
[Normal] : Gait: normal [de-identified] : CONSTITUTIONAL: The patient is a very pleasant individual who is well-nourished and who appears stated age.\par PSYCHIATRIC: The patient is alert and oriented X 3 and in no apparent distress, and participates with orthopedic evaluation well.\par HEAD: Atraumatic and is nonsyndromic in appearance.\par EENT: No visible thyromegaly, EOMI.\par RESPIRATORY: Respiratory rate is regular, not dyspneic on examination.\par LYMPHATICS: There is no inguinal lymphadenopathy\par INTEGUMENTARY: Skin is clean, dry, and intact about the bilateral lower extremities and lumbar spine.\par VASCULAR: There is brisk capillary refill about the bilateral lower extremities.\par NEUROLOGIC: There are no pathologic reflexes. There is no decrease in sensation of the bilateral lower extremities on Wartenberg pinwheel/manual examination. Deep tendon reflexes are well maintained at 2+/4 of the bilateral lower extremities and are symmetric..\par MUSCULOSKELETAL: There is no visible muscular atrophy. Manual motor strength is well maintained in the bilateral lower extremities. Range of motion of lumbar spine is well maintained. The patient ambulates in a non-myelopathic manner. Negative tension sign and straight leg raise bilaterally. Quad extension, ankle dorsiflexion, EHL, plantar flexion, and ankle eversion are well preserved. Normal secondary orthopaedic exam of bilateral hips, greater trochanteric area, knees and ankles\par  [de-identified] : Previous x-rays up in review of the lumbar spine it shows some focal L5-S1 lumbar degenerative disc disease 24

## 2024-03-09 NOTE — ED ADULT NURSE NOTE - NSFALLHARMRISKINTERV_ED_ALL_ED

## 2024-03-09 NOTE — CONSULT NOTE ADULT - SUBJECTIVE AND OBJECTIVE BOX
HPI:  This is an 83 yo female with PMHx of HTN, HLD, DM, CKD4 presenting to the ED for chest pain. Patient's son reports pain started at midnight in left shoulder, describes pain is persistent, aching, nonradiating. Patient admits to associated SOB and orthopnea, and states pain is alleviated when sitting/standing up.  Patient also reports mild dull HA, 5/10. Patient currently reports chest pain has significantly improved.  Denies numbness/tingling, fever, chills, abdominal pain, dysuria, V/D, palpitations, lightheadedness, dizziness, weakness at this time.  (09 Mar 2024 11:06)        HPI-Cardiology   Pt with the above Hx and HPI, evaluated at bedside. Pt presented for eval of sudden onset CP that awoke her from sleep. Pt describes the pain as "achy" that started on her left shoulder, radiating to the midsternal area. Pain worse on deep inspiration and improves when she sits forward. Pt denies similar episodes in the past, or any cardiac workup in the past. Currently denies any CP, SOB, palpitations, dizziness/lightheadedness. Radiology tests and hospital records, were reviewed, as well as previous notes on this patient.          PAST MEDICAL & SURGICAL HISTORY  HTN (hypertension)    HLD (hyperlipidemia)    DM (diabetes mellitus)    No significant past surgical history        ALLERGIES:  No Known Allergies      MEDICATIONS:  MEDICATIONS  (STANDING):  amLODIPine   Tablet 5 milliGRAM(s) Oral daily  doxazosin 1 milliGRAM(s) Oral daily  heparin   Injectable 5000 Unit(s) SubCutaneous every 8 hours  insulin glargine Injectable (LANTUS) 10 Unit(s) SubCutaneous at bedtime  insulin lispro (ADMELOG) corrective regimen sliding scale   SubCutaneous three times a day before meals  metoprolol tartrate 50 milliGRAM(s) Oral two times a day  simvastatin 20 milliGRAM(s) Oral at bedtime  sodium bicarbonate 650 milliGRAM(s) Oral two times a day    MEDICATIONS  (PRN):  acetaminophen     Tablet .. 650 milliGRAM(s) Oral every 6 hours PRN Temp greater or equal to 38C (100.4F), Mild Pain (1 - 3)  melatonin 3 milliGRAM(s) Oral at bedtime PRN Insomnia  ondansetron Injectable 4 milliGRAM(s) IV Push every 8 hours PRN Nausea and/or Vomiting      HOME MEDICATIONS:  Home Medications:  amLODIPine 5 mg oral tablet: 1 tab(s) orally (09 Mar 2024 10:52)  Basaglar KwikPen 100 units/mL subcutaneous solution: 10 unit(s) subcutaneous once a day (09 Mar 2024 11:15)  doxazosin 1 mg oral tablet: 1 tab(s) orally (09 Mar 2024 10:52)  metoprolol tartrate 50 mg oral tablet: 1 tab(s) orally 2 times a day (09 Mar 2024 11:49)  pioglitazone 30 mg oral tablet: 1 tab(s) orally (09 Mar 2024 10:52)  simvastatin 20 mg oral tablet: 1 tab(s) orally (09 Mar 2024 10:52)  sodium bicarbonate 650 mg oral tablet: 1 tab(s) orally 2 times a day (09 Mar 2024 11:46)      VITALS:   T(F): 98.9 (03-09 @ 11:57), Max: 98.9 (03-09 @ 11:57)  HR: 104 (03-09 @ 11:57) (92 - 108)  BP: 183/71 (03-09 @ 11:57) (143/66 - 188/66)  BP(mean): --  RR: 16 (03-09 @ 11:57) (16 - 18)  SpO2: 97% (03-09 @ 11:57) (96% - 97%)          REVIEW OF SYSTEMS:  See HPI      PHYSICAL EXAM:  NEURO: patient is awake , alert and oriented  GEN: Not in acute distress  NECK: no thyroid enlargement, no JVD  LUNGS: Clear to auscultation bilaterally   CARDIOVASCULAR: S1/S2 present, RRR , no murmurs or rubs, no carotid bruits,  + PP bilaterally  ABD: Soft, non-tender, non-distended, +BS  EXT: No RAIN  SKIN: Intact        LABS:                        10.8   10.18 )-----------( 210      ( 09 Mar 2024 07:40 )             32.0     03-09    137  |  102  |  54<H>  ----------------------------<  138<H>  4.8   |  22  |  2.4<H>    Ca    9.3      09 Mar 2024 07:40    TPro  7.9  /  Alb  4.4  /  TBili  0.2  /  DBili  x   /  AST  17  /  ALT  9   /  AlkPhos  69  03-09    PT/INR - ( 09 Mar 2024 07:40 )   PT: 10.60 sec;   INR: 0.93 ratio         PTT - ( 09 Mar 2024 07:40 )  PTT:31.8 sec            ECG:  < from: 12 Lead ECG (03.09.24 @ 07:04) >   Normal sinus rhythm  Normal ECG    Confirmed by DRE MORRIS, ASA ([id number]) on 3/9/2024 9:08:56 AM    < end of copied text >     HPI:  This is an 83 yo female with PMHx of HTN, HLD, DM, CKD4 presenting to the ED for chest pain. Patient's son reports pain started at midnight in left shoulder, describes pain is persistent, aching, nonradiating. Patient admits to associated SOB and orthopnea, and states pain is alleviated when sitting/standing up.  Patient also reports mild dull HA, 5/10. Patient currently reports chest pain has significantly improved.  Denies numbness/tingling, fever, chills, abdominal pain, dysuria, V/D, palpitations, lightheadedness, dizziness, weakness at this time.  (09 Mar 2024 11:06)        HPI-Cardiology   Pt with the above Hx and HPI, evaluated at bedside. Pt is [place] speaking and translation done by Pt's son at bedside. Pt presented for eval of sudden onset CP that awoke her from sleep. Pt describes the pain as "achy" that started on her left shoulder, radiating to the midsternal area. Pain worse on deep inspiration and improves when she sits forward. Pt denies similar episodes in the past, or any cardiac workup in the past. Currently denies any CP, SOB, palpitations, dizziness/lightheadedness. Radiology tests and hospital records, were reviewed, as well as previous notes on this patient.          PAST MEDICAL & SURGICAL HISTORY  HTN (hypertension)    HLD (hyperlipidemia)    DM (diabetes mellitus)    No significant past surgical history        ALLERGIES:  No Known Allergies      MEDICATIONS:  MEDICATIONS  (STANDING):  amLODIPine   Tablet 5 milliGRAM(s) Oral daily  doxazosin 1 milliGRAM(s) Oral daily  heparin   Injectable 5000 Unit(s) SubCutaneous every 8 hours  insulin glargine Injectable (LANTUS) 10 Unit(s) SubCutaneous at bedtime  insulin lispro (ADMELOG) corrective regimen sliding scale   SubCutaneous three times a day before meals  metoprolol tartrate 50 milliGRAM(s) Oral two times a day  simvastatin 20 milliGRAM(s) Oral at bedtime  sodium bicarbonate 650 milliGRAM(s) Oral two times a day    MEDICATIONS  (PRN):  acetaminophen     Tablet .. 650 milliGRAM(s) Oral every 6 hours PRN Temp greater or equal to 38C (100.4F), Mild Pain (1 - 3)  melatonin 3 milliGRAM(s) Oral at bedtime PRN Insomnia  ondansetron Injectable 4 milliGRAM(s) IV Push every 8 hours PRN Nausea and/or Vomiting      HOME MEDICATIONS:  Home Medications:  amLODIPine 5 mg oral tablet: 1 tab(s) orally (09 Mar 2024 10:52)  Basaglar KwikPen 100 units/mL subcutaneous solution: 10 unit(s) subcutaneous once a day (09 Mar 2024 11:15)  doxazosin 1 mg oral tablet: 1 tab(s) orally (09 Mar 2024 10:52)  metoprolol tartrate 50 mg oral tablet: 1 tab(s) orally 2 times a day (09 Mar 2024 11:49)  pioglitazone 30 mg oral tablet: 1 tab(s) orally (09 Mar 2024 10:52)  simvastatin 20 mg oral tablet: 1 tab(s) orally (09 Mar 2024 10:52)  sodium bicarbonate 650 mg oral tablet: 1 tab(s) orally 2 times a day (09 Mar 2024 11:46)      VITALS:   T(F): 98.9 (03-09 @ 11:57), Max: 98.9 (03-09 @ 11:57)  HR: 104 (03-09 @ 11:57) (92 - 108)  BP: 183/71 (03-09 @ 11:57) (143/66 - 188/66)  BP(mean): --  RR: 16 (03-09 @ 11:57) (16 - 18)  SpO2: 97% (03-09 @ 11:57) (96% - 97%)          REVIEW OF SYSTEMS:  See HPI      PHYSICAL EXAM:  NEURO: patient is awake , alert and oriented  GEN: Not in acute distress  NECK: no thyroid enlargement, no JVD  LUNGS: Clear to auscultation bilaterally   CARDIOVASCULAR: S1/S2 present, RRR , no murmurs or rubs, no carotid bruits,  + PP bilaterally  ABD: Soft, non-tender, non-distended, +BS  EXT: No RAIN  SKIN: Intact        LABS:                        10.8   10.18 )-----------( 210      ( 09 Mar 2024 07:40 )             32.0     03-09    137  |  102  |  54<H>  ----------------------------<  138<H>  4.8   |  22  |  2.4<H>    Ca    9.3      09 Mar 2024 07:40    TPro  7.9  /  Alb  4.4  /  TBili  0.2  /  DBili  x   /  AST  17  /  ALT  9   /  AlkPhos  69  03-09    PT/INR - ( 09 Mar 2024 07:40 )   PT: 10.60 sec;   INR: 0.93 ratio         PTT - ( 09 Mar 2024 07:40 )  PTT:31.8 sec            ECG:  < from: 12 Lead ECG (03.09.24 @ 07:04) >   Normal sinus rhythm  Normal ECG    Confirmed by DRE MORRIS, ASA ([id number]) on 3/9/2024 9:08:56 AM    < end of copied text >

## 2024-03-09 NOTE — ED ADULT NURSE NOTE - SUICIDE SCREENING QUESTION 2
Jovanny Irving presents to the clinic today for management of his anticoagulation therapy in treatment of his atrial fibrillation. Target INR is 2.0-3.0. His INR today was therapeutic at 2.4 on 16 mg of Warfarin weekly. His previous INR was therapeutic at 2.3 on 6/22/23. The patient denies medication changes since the last visit. He  denies diet changes since the last visit. He was able to ambulate to office without assistive device. Jovanny will continue taking 16 mg of Warfarin weekly (2mg on Mondays, Wednesdays, and Fridays and 2.5 mg the other 4 days of the week) and return to the clinic in another 4 weeks on 8/16/23 for INR fingerstick. Onsite billing provider is REJI Perez.     
No

## 2024-03-09 NOTE — ED PROVIDER NOTE - WHICH SHOWED
EKG no ischemia. NSR 95. NO ST elevation. EKG no ischemia. NSR 95. NO ST elevation.  No infiltrate on x-ray.

## 2024-03-09 NOTE — H&P ADULT - NS ATTEND AMEND GEN_ALL_CORE FT
Exam:  Gen: NAD, resting in bed  HEENT: Normocephalic, atraumatic  CV: Regular rate & regular rhythm  Lungs: CTABL no wheeze  Abdomen: Soft, NTND+ BS present  Ext: Warm, well perfused  Neuro: non focal, awake, CN II-XII intact   Skin: no visible rash, no erythema  Psych: no SI, HI, Hallucination     MDM:  1. Chest pain - trend troponin, Echo, Tele, Cards consult  2. HTN - resume home meds and monitor  3. DM - cont home insulin regimen

## 2024-03-09 NOTE — ED PROVIDER NOTE - OBJECTIVE STATEMENT
84-year-old female past medical history hypertension, hyperlipidemia, diabetes presents for evaluation of chest pain.  Patient woke at midnight with left-sided aching chest pain that has been persistent with associated shortness of breath.  No inciting relieving factors.  Denies fever, headache, cough, abdominal pain, dysuria, hematuria, vomiting, diarrhea.

## 2024-03-09 NOTE — ED PROVIDER NOTE - ATTENDING APP SHARED VISIT CONTRIBUTION OF CARE
Pt reports chest pain, cannot describe, associated with nausea, orthopnea and shortness of breath. Constant since midnight and not worsening. No feverish. no cough, no ST. no bodyaches. No leg pain or swelling. No rash. Only GYN surgery in past. NO leg pain or swelling. Hx of HTN, DM, renal issue(sees nephrology). On exam S1S2 rrr, no JVD, left basilar crackles, no edema or calf tenderness or swelling. NO distress.

## 2024-03-09 NOTE — ED PROVIDER NOTE - PHYSICAL EXAMINATION
CONST: NAD  EYES: Sclera and conjunctiva clear.   ENT: No nasal discharge. Oropharynx normal appearing  NECK: Non-tender, no meningeal signs. normal ROM. supple   CARD: S1 S2; No jvd  RESP: Faint crackles at b/l bases. No distress  GI: Soft, non-tender, non-distended. no cva tenderness. normal BS  MS: Normal ROM in all extremities. pulses 2 +. no calf tenderness or swelling  SKIN: Warm, dry, no acute rashes. Good turgor  NEURO: A&Ox3, No focal deficits. Strength 5/5 with no sensory deficits.

## 2024-03-09 NOTE — PATIENT PROFILE ADULT - FALL HARM RISK - HARM RISK INTERVENTIONS

## 2024-03-09 NOTE — H&P ADULT - NSHPPHYSICALEXAM_GEN_ALL_CORE
T(C): 36.7 (03-09-24 @ 07:01), Max: 36.7 (03-09-24 @ 07:01)  HR: 98 (03-09-24 @ 10:58) (92 - 108)  BP: 143/66 (03-09-24 @ 10:58) (143/66 - 188/66)  RR: 18 (03-09-24 @ 10:58) (16 - 18)  SpO2: 96% (03-09-24 @ 10:58) (96% - 96%)    PHYSICAL EXAM:  GENERAL: NAD, well-developed, looks stated age  HEAD:  Atraumatic, Normocephalic  EYES: Conjunctiva and sclera clear  CHEST/LUNG: Clear to auscultation bilaterally; No rales, rhonchi or wheezing  HEART: S1,S2 Regular rate and rhythm; No murmurs, rubs, or gallops  ABDOMEN: Soft, nontender, nondistended, no rebound tenderness  EXTREMITIES:  2+ peripheral radial pulses, no clubbing, cyanosis, or edema  NEUROLOGY: non-focal, muscle strength 5/5 all extremities  SKIN: No rashes or lesions

## 2024-03-09 NOTE — CONSULT NOTE ADULT - ASSESSMENT
85 yo female with PMHx of HTN, HLD, DM, and CKD4, presented to the ED for eval of CP      Impression:  #Chest pain: r/o ACS  #HTN/HLD/DM      Atypical presentation for ACS. Pt currently CP free  Trop mildly elevated 36-->36. Possibly in the setting of CKD  ECG: NS, no ischemic changes         Plan:  Can trend trop x1  Check lipid profile, TSH, HgA1C  Repeat ECG  Monitor lytes  Obtain TTE. Can consider as outpatient  Consider pharmacological thallium stress test inn 3-5 weeks as outpatient  Cont w/ current cardiac home meds

## 2024-03-10 LAB
ANION GAP SERPL CALC-SCNC: 8 MMOL/L — SIGNIFICANT CHANGE UP (ref 7–14)
BASOPHILS # BLD AUTO: 0.02 K/UL — SIGNIFICANT CHANGE UP (ref 0–0.2)
BASOPHILS NFR BLD AUTO: 0.3 % — SIGNIFICANT CHANGE UP (ref 0–1)
BUN SERPL-MCNC: 60 MG/DL — HIGH (ref 10–20)
CALCIUM SERPL-MCNC: 8.3 MG/DL — LOW (ref 8.4–10.5)
CHLORIDE SERPL-SCNC: 103 MMOL/L — SIGNIFICANT CHANGE UP (ref 98–110)
CHOLEST SERPL-MCNC: 148 MG/DL — SIGNIFICANT CHANGE UP
CO2 SERPL-SCNC: 22 MMOL/L — SIGNIFICANT CHANGE UP (ref 17–32)
CREAT SERPL-MCNC: 3 MG/DL — HIGH (ref 0.7–1.5)
EGFR: 15 ML/MIN/1.73M2 — LOW
EOSINOPHIL # BLD AUTO: 0.03 K/UL — SIGNIFICANT CHANGE UP (ref 0–0.7)
EOSINOPHIL NFR BLD AUTO: 0.4 % — SIGNIFICANT CHANGE UP (ref 0–8)
GLUCOSE BLDC GLUCOMTR-MCNC: 68 MG/DL — LOW (ref 70–99)
GLUCOSE SERPL-MCNC: 62 MG/DL — LOW (ref 70–99)
HCT VFR BLD CALC: 24.5 % — LOW (ref 37–47)
HDLC SERPL-MCNC: 87 MG/DL — SIGNIFICANT CHANGE UP
HGB BLD-MCNC: 8 G/DL — LOW (ref 12–16)
IMM GRANULOCYTES NFR BLD AUTO: 0.4 % — HIGH (ref 0.1–0.3)
LIPID PNL WITH DIRECT LDL SERPL: 53 MG/DL — SIGNIFICANT CHANGE UP
LYMPHOCYTES # BLD AUTO: 1.54 K/UL — SIGNIFICANT CHANGE UP (ref 1.2–3.4)
LYMPHOCYTES # BLD AUTO: 20.1 % — LOW (ref 20.5–51.1)
MAGNESIUM SERPL-MCNC: 2.1 MG/DL — SIGNIFICANT CHANGE UP (ref 1.8–2.4)
MCHC RBC-ENTMCNC: 29.1 PG — SIGNIFICANT CHANGE UP (ref 27–31)
MCHC RBC-ENTMCNC: 32.7 G/DL — SIGNIFICANT CHANGE UP (ref 32–37)
MCV RBC AUTO: 89.1 FL — SIGNIFICANT CHANGE UP (ref 81–99)
MONOCYTES # BLD AUTO: 0.82 K/UL — HIGH (ref 0.1–0.6)
MONOCYTES NFR BLD AUTO: 10.7 % — HIGH (ref 1.7–9.3)
NEUTROPHILS # BLD AUTO: 5.22 K/UL — SIGNIFICANT CHANGE UP (ref 1.4–6.5)
NEUTROPHILS NFR BLD AUTO: 68.1 % — SIGNIFICANT CHANGE UP (ref 42.2–75.2)
NON HDL CHOLESTEROL: 61 MG/DL — SIGNIFICANT CHANGE UP
NRBC # BLD: 0 /100 WBCS — SIGNIFICANT CHANGE UP (ref 0–0)
PLATELET # BLD AUTO: 188 K/UL — SIGNIFICANT CHANGE UP (ref 130–400)
PMV BLD: 10 FL — SIGNIFICANT CHANGE UP (ref 7.4–10.4)
POTASSIUM SERPL-MCNC: 5.4 MMOL/L — HIGH (ref 3.5–5)
POTASSIUM SERPL-SCNC: 5.4 MMOL/L — HIGH (ref 3.5–5)
RBC # BLD: 2.75 M/UL — LOW (ref 4.2–5.4)
RBC # FLD: 17.1 % — HIGH (ref 11.5–14.5)
SODIUM SERPL-SCNC: 133 MMOL/L — LOW (ref 135–146)
TRIGL SERPL-MCNC: 39 MG/DL — SIGNIFICANT CHANGE UP
TROPONIN T, HIGH SENSITIVITY RESULT: 53 NG/L — CRITICAL HIGH (ref 6–13)
WBC # BLD: 7.66 K/UL — SIGNIFICANT CHANGE UP (ref 4.8–10.8)
WBC # FLD AUTO: 7.66 K/UL — SIGNIFICANT CHANGE UP (ref 4.8–10.8)

## 2024-03-10 PROCEDURE — 99222 1ST HOSP IP/OBS MODERATE 55: CPT

## 2024-03-10 PROCEDURE — 99232 SBSQ HOSP IP/OBS MODERATE 35: CPT

## 2024-03-10 RX ORDER — REGADENOSON 0.08 MG/ML
0.4 INJECTION, SOLUTION INTRAVENOUS ONCE
Refills: 0 | Status: DISCONTINUED | OUTPATIENT
Start: 2024-03-10 | End: 2024-03-12

## 2024-03-10 RX ADMIN — SIMVASTATIN 20 MILLIGRAM(S): 20 TABLET, FILM COATED ORAL at 21:19

## 2024-03-10 RX ADMIN — Medication 650 MILLIGRAM(S): at 17:24

## 2024-03-10 RX ADMIN — HEPARIN SODIUM 5000 UNIT(S): 5000 INJECTION INTRAVENOUS; SUBCUTANEOUS at 21:19

## 2024-03-10 RX ADMIN — AMLODIPINE BESYLATE 5 MILLIGRAM(S): 2.5 TABLET ORAL at 06:35

## 2024-03-10 RX ADMIN — Medication 650 MILLIGRAM(S): at 06:35

## 2024-03-10 RX ADMIN — HEPARIN SODIUM 5000 UNIT(S): 5000 INJECTION INTRAVENOUS; SUBCUTANEOUS at 06:35

## 2024-03-10 RX ADMIN — HEPARIN SODIUM 5000 UNIT(S): 5000 INJECTION INTRAVENOUS; SUBCUTANEOUS at 17:24

## 2024-03-10 RX ADMIN — Medication 50 MILLIGRAM(S): at 06:35

## 2024-03-10 RX ADMIN — Medication 1 MILLIGRAM(S): at 06:35

## 2024-03-10 RX ADMIN — INSULIN GLARGINE 10 UNIT(S): 100 INJECTION, SOLUTION SUBCUTANEOUS at 21:18

## 2024-03-10 RX ADMIN — Medication 50 MILLIGRAM(S): at 17:24

## 2024-03-10 NOTE — PROGRESS NOTE ADULT - SUBJECTIVE AND OBJECTIVE BOX
MERLENE GARCIA  84y  Female      Patient is a 84y old  Female who presents with a chief complaint of chest pain (09 Mar 2024 12:48)      INTERVAL HPI/OVERNIGHT EVENTS:  Pt seen and examined. Discussed with son at bedside. Pt has mild chest pain occasionally.     Vital Signs Last 24 Hrs  T(C): 36.4 (10 Mar 2024 04:30), Max: 37.5 (09 Mar 2024 14:09)  T(F): 97.6 (10 Mar 2024 04:30), Max: 99.5 (09 Mar 2024 14:09)  HR: 79 (10 Mar 2024 04:30) (73 - 104)  BP: 111/68 (10 Mar 2024 04:30) (111/68 - 183/71)  BP(mean): --  RR: 16 (10 Mar 2024 04:30) (16 - 16)  SpO2: 91% (10 Mar 2024 04:30) (91% - 97%)    Parameters below as of 09 Mar 2024 21:21  Patient On (Oxygen Delivery Method): room air        PHYSICAL EXAM:  Gen: NAD, resting in bed  HEENT: Normocephalic, atraumatic  Neck: supple, no lymphadenopathy  CV: Regular rate & regular rhythm  Lungs: CTABL no wheeze  Abdomen: Soft, NTND+ BS present  Ext: Warm, well perfused no CCE  Neuro: non focal, awake, CN II-XII intact   Skin: no rash, no erythema  Psych: no SI, HI, Hallucination     Consultant(s) Notes Reviewed:  [x ] YES  [ ] NO  Care Discussed with Consultants/Other Providers [ x] YES  [ ] NO    LABS:                        8.0    7.66  )-----------( 188      ( 10 Mar 2024 07:40 )             24.5     03-10    133<L>  |  103  |  60<H>  ----------------------------<  62<L>  5.4<H>   |  22  |  3.0<H>    Ca    8.3<L>      10 Mar 2024 07:40  Mg     2.1     03-10    TPro  7.9  /  Alb  4.4  /  TBili  0.2  /  DBili  x   /  AST  17  /  ALT  9   /  AlkPhos  69  03-09    PT/INR - ( 09 Mar 2024 07:40 )   PT: 10.60 sec;   INR: 0.93 ratio         PTT - ( 09 Mar 2024 07:40 )  PTT:31.8 sec           CAPILLARY BLOOD GLUCOSE      POCT Blood Glucose.: 131 mg/dL (10 Mar 2024 11:46)  POCT Blood Glucose.: 144 mg/dL (10 Mar 2024 08:55)  POCT Blood Glucose.: 68 mg/dL (10 Mar 2024 07:43)  POCT Blood Glucose.: 176 mg/dL (09 Mar 2024 20:21)  POCT Blood Glucose.: 181 mg/dL (09 Mar 2024 16:31)  POCT Blood Glucose.: 153 mg/dL (09 Mar 2024 12:01)         ASSESSMENT AND PLAN:  This is an 85 yo female with PMHx of HTN, HLD, DM, CKD4 presenting to the ED for chest pain.    Plan:   #Chest Pain- R/O ACS  -Admit to medicine   -s/p  x1 in ED   -EKG: NSR, no ischemic changes   -Trend tronopin 36>36>50  -Cardiology consulted, plan for lexiscan tomorrow  -Supplemental oxygen prn   -Pain control prn     #HTN/HLD  -C/w home amlodipine, simvastatin, metoprolol, cardura    #DM2  -ISS while inpatient  -home Basaglar 10 u qd   -holding home Pioglitazone     #CKD4  -c/w sodium bicarb   -monitor renal function closely, creat uptrending     #Diet:DASH  #Activity: OOB with assistance   #DVT ppx: HSQ

## 2024-03-11 LAB
A1C WITH ESTIMATED AVERAGE GLUCOSE RESULT: 7.9 % — HIGH (ref 4–5.6)
ANION GAP SERPL CALC-SCNC: 10 MMOL/L — SIGNIFICANT CHANGE UP (ref 7–14)
APPEARANCE UR: CLEAR — SIGNIFICANT CHANGE UP
BILIRUB UR-MCNC: NEGATIVE — SIGNIFICANT CHANGE UP
BUN SERPL-MCNC: 63 MG/DL — CRITICAL HIGH (ref 10–20)
CALCIUM SERPL-MCNC: 8.2 MG/DL — LOW (ref 8.4–10.5)
CHLORIDE SERPL-SCNC: 102 MMOL/L — SIGNIFICANT CHANGE UP (ref 98–110)
CO2 SERPL-SCNC: 22 MMOL/L — SIGNIFICANT CHANGE UP (ref 17–32)
COLOR SPEC: YELLOW — SIGNIFICANT CHANGE UP
CREAT SERPL-MCNC: 3.2 MG/DL — HIGH (ref 0.7–1.5)
DIFF PNL FLD: ABNORMAL
EGFR: 14 ML/MIN/1.73M2 — LOW
ESTIMATED AVERAGE GLUCOSE: 180 MG/DL — HIGH (ref 68–114)
GLUCOSE SERPL-MCNC: 96 MG/DL — SIGNIFICANT CHANGE UP (ref 70–99)
GLUCOSE UR QL: NEGATIVE MG/DL — SIGNIFICANT CHANGE UP
HCT VFR BLD CALC: 23.7 % — LOW (ref 37–47)
HGB BLD-MCNC: 7.8 G/DL — LOW (ref 12–16)
KETONES UR-MCNC: ABNORMAL MG/DL
LEUKOCYTE ESTERASE UR-ACNC: NEGATIVE — SIGNIFICANT CHANGE UP
MAGNESIUM SERPL-MCNC: 2.1 MG/DL — SIGNIFICANT CHANGE UP (ref 1.8–2.4)
MCHC RBC-ENTMCNC: 29 PG — SIGNIFICANT CHANGE UP (ref 27–31)
MCHC RBC-ENTMCNC: 32.9 G/DL — SIGNIFICANT CHANGE UP (ref 32–37)
MCV RBC AUTO: 88.1 FL — SIGNIFICANT CHANGE UP (ref 81–99)
NITRITE UR-MCNC: NEGATIVE — SIGNIFICANT CHANGE UP
NRBC # BLD: 0 /100 WBCS — SIGNIFICANT CHANGE UP (ref 0–0)
PH UR: 6.5 — SIGNIFICANT CHANGE UP (ref 5–8)
PHOSPHATE SERPL-MCNC: 4.1 MG/DL — SIGNIFICANT CHANGE UP (ref 2.1–4.9)
PLATELET # BLD AUTO: 182 K/UL — SIGNIFICANT CHANGE UP (ref 130–400)
PMV BLD: 9.9 FL — SIGNIFICANT CHANGE UP (ref 7.4–10.4)
POTASSIUM SERPL-MCNC: 5.5 MMOL/L — HIGH (ref 3.5–5)
POTASSIUM SERPL-SCNC: 5.5 MMOL/L — HIGH (ref 3.5–5)
PROT UR-MCNC: 300 MG/DL
RBC # BLD: 2.69 M/UL — LOW (ref 4.2–5.4)
RBC # FLD: 16.7 % — HIGH (ref 11.5–14.5)
SODIUM SERPL-SCNC: 134 MMOL/L — LOW (ref 135–146)
SP GR SPEC: 1.01 — SIGNIFICANT CHANGE UP (ref 1–1.03)
TROPONIN T, HIGH SENSITIVITY RESULT: 59 NG/L — CRITICAL HIGH (ref 6–13)
UROBILINOGEN FLD QL: 0.2 MG/DL — SIGNIFICANT CHANGE UP (ref 0.2–1)
WBC # BLD: 7.01 K/UL — SIGNIFICANT CHANGE UP (ref 4.8–10.8)
WBC # FLD AUTO: 7.01 K/UL — SIGNIFICANT CHANGE UP (ref 4.8–10.8)

## 2024-03-11 PROCEDURE — 93306 TTE W/DOPPLER COMPLETE: CPT | Mod: 26

## 2024-03-11 PROCEDURE — 99232 SBSQ HOSP IP/OBS MODERATE 35: CPT

## 2024-03-11 PROCEDURE — 78452 HT MUSCLE IMAGE SPECT MULT: CPT | Mod: 26

## 2024-03-11 RX ORDER — SODIUM CHLORIDE 9 MG/ML
1000 INJECTION INTRAMUSCULAR; INTRAVENOUS; SUBCUTANEOUS
Refills: 0 | Status: DISCONTINUED | OUTPATIENT
Start: 2024-03-11 | End: 2024-03-12

## 2024-03-11 RX ORDER — SODIUM CHLORIDE 9 MG/ML
1000 INJECTION INTRAMUSCULAR; INTRAVENOUS; SUBCUTANEOUS ONCE
Refills: 0 | Status: DISCONTINUED | OUTPATIENT
Start: 2024-03-11 | End: 2024-03-11

## 2024-03-11 RX ORDER — CHLORHEXIDINE GLUCONATE 213 G/1000ML
1 SOLUTION TOPICAL
Refills: 0 | Status: DISCONTINUED | OUTPATIENT
Start: 2024-03-11 | End: 2024-03-12

## 2024-03-11 RX ORDER — SODIUM ZIRCONIUM CYCLOSILICATE 10 G/10G
10 POWDER, FOR SUSPENSION ORAL ONCE
Refills: 0 | Status: COMPLETED | OUTPATIENT
Start: 2024-03-11 | End: 2024-03-11

## 2024-03-11 RX ADMIN — SODIUM ZIRCONIUM CYCLOSILICATE 10 GRAM(S): 10 POWDER, FOR SUSPENSION ORAL at 14:48

## 2024-03-11 RX ADMIN — Medication 50 MILLIGRAM(S): at 05:17

## 2024-03-11 RX ADMIN — HEPARIN SODIUM 5000 UNIT(S): 5000 INJECTION INTRAVENOUS; SUBCUTANEOUS at 14:48

## 2024-03-11 RX ADMIN — Medication 1 MILLIGRAM(S): at 05:17

## 2024-03-11 RX ADMIN — HEPARIN SODIUM 5000 UNIT(S): 5000 INJECTION INTRAVENOUS; SUBCUTANEOUS at 21:33

## 2024-03-11 RX ADMIN — SODIUM CHLORIDE 100 MILLILITER(S): 9 INJECTION INTRAMUSCULAR; INTRAVENOUS; SUBCUTANEOUS at 16:37

## 2024-03-11 RX ADMIN — HEPARIN SODIUM 5000 UNIT(S): 5000 INJECTION INTRAVENOUS; SUBCUTANEOUS at 05:16

## 2024-03-11 RX ADMIN — AMLODIPINE BESYLATE 5 MILLIGRAM(S): 2.5 TABLET ORAL at 05:17

## 2024-03-11 RX ADMIN — Medication 50 MILLIGRAM(S): at 18:17

## 2024-03-11 RX ADMIN — Medication 650 MILLIGRAM(S): at 18:17

## 2024-03-11 RX ADMIN — Medication 1: at 16:37

## 2024-03-11 RX ADMIN — INSULIN GLARGINE 10 UNIT(S): 100 INJECTION, SOLUTION SUBCUTANEOUS at 21:32

## 2024-03-11 RX ADMIN — Medication 650 MILLIGRAM(S): at 05:16

## 2024-03-11 RX ADMIN — SIMVASTATIN 20 MILLIGRAM(S): 20 TABLET, FILM COATED ORAL at 21:33

## 2024-03-11 NOTE — PROGRESS NOTE ADULT - SUBJECTIVE AND OBJECTIVE BOX
MERLENE GARCIA  84y  Female      Patient is a 84y old  Female who presents with a chief complaint of chest pain (10 Mar 2024 11:48)      INTERVAL HPI/OVERNIGHT EVENTS:  Pt seen and examined. No complaints. Going for stress test today. Son at bedside.        Vital Signs Last 24 Hrs  T(C): 36.2 (11 Mar 2024 04:40), Max: 37.6 (10 Mar 2024 21:00)  T(F): 97.2 (11 Mar 2024 04:40), Max: 99.7 (10 Mar 2024 21:00)  HR: 82 (11 Mar 2024 04:40) (71 - 90)  BP: 128/60 (11 Mar 2024 04:40) (119/66 - 135/59)  BP(mean): --  RR: 16 (11 Mar 2024 04:40) (16 - 16)  SpO2: 90% (11 Mar 2024 04:40) (90% - 92%)    Parameters below as of 11 Mar 2024 04:40  Patient On (Oxygen Delivery Method): room air        PHYSICAL EXAM:  Gen: NAD, resting in bed  HEENT: Normocephalic, atraumatic  Neck: supple, no lymphadenopathy  CV: Regular rate & regular rhythm  Lungs: CTABL no wheeze  Abdomen: Soft, NTND+ BS present  Ext: Warm, well perfused no CCE  Neuro: non focal, awake, CN II-XII intact   Skin: no rash, no erythema  Psych: no SI, HI, Hallucination     Consultant(s) Notes Reviewed:  [x ] YES  [ ] NO  Care Discussed with Consultants/Other Providers [ x] YES  [ ] NO    LABS:                        7.8    7.01  )-----------( 182      ( 11 Mar 2024 06:54 )             23.7     03-11    134<L>  |  102  |  63<HH>  ----------------------------<  96  5.5<H>   |  22  |  3.2<H>    Ca    8.2<L>      11 Mar 2024 06:54  Phos  4.1     03-11  Mg     2.1     03-11         CAPILLARY BLOOD GLUCOSE      POCT Blood Glucose.: 88 mg/dL (11 Mar 2024 12:07)  POCT Blood Glucose.: 90 mg/dL (11 Mar 2024 07:52)  POCT Blood Glucose.: 199 mg/dL (10 Mar 2024 20:51)  POCT Blood Glucose.: 133 mg/dL (10 Mar 2024 17:02)      RADIOLOGY & ADDITIONAL TESTS:    Imaging Personally Reviewed:  [ ] YES  [ ] NO      ASSESSMENT AND PLAN:  This is an 83 yo female with PMHx of HTN, HLD, DM, CKD4 presenting to the ED for chest pain.    Plan:   #Chest Pain- R/O ACS  -Admit to medicine   -s/p  x1 in ED   -EKG: NSR, no ischemic changes   -Trend tronopin 36>36>50  -Cardiology consulted, plan for lexiscan today  -Supplemental oxygen prn   -Pain control prn     #HTN/HLD  -C/w home amlodipine, simvastatin, metoprolol, cardura    #DM2  -ISS while inpatient  -home Basaglar 10 u qd   -holding home Pioglitazone     #CKD4  -c/w sodium bicarb   -renal function worsening, will consult Nephrology    #Hyperkalemia  -Yazmin ordered     #Diet: DASH  #Activity: OOB with assistance   #DVT ppx: HSQ

## 2024-03-12 VITALS
OXYGEN SATURATION: 93 % | HEART RATE: 101 BPM | DIASTOLIC BLOOD PRESSURE: 67 MMHG | TEMPERATURE: 97 F | SYSTOLIC BLOOD PRESSURE: 143 MMHG

## 2024-03-12 LAB
ANION GAP SERPL CALC-SCNC: 13 MMOL/L — SIGNIFICANT CHANGE UP (ref 7–14)
BUN SERPL-MCNC: 65 MG/DL — CRITICAL HIGH (ref 10–20)
CALCIUM SERPL-MCNC: 8.2 MG/DL — LOW (ref 8.4–10.5)
CHLORIDE SERPL-SCNC: 104 MMOL/L — SIGNIFICANT CHANGE UP (ref 98–110)
CO2 SERPL-SCNC: 22 MMOL/L — SIGNIFICANT CHANGE UP (ref 17–32)
CREAT SERPL-MCNC: 2.8 MG/DL — HIGH (ref 0.7–1.5)
EGFR: 16 ML/MIN/1.73M2 — LOW
FERRITIN SERPL-MCNC: 347 NG/ML — HIGH (ref 13–330)
GLUCOSE SERPL-MCNC: 132 MG/DL — HIGH (ref 70–99)
HCT VFR BLD CALC: 24.6 % — LOW (ref 37–47)
HGB BLD-MCNC: 8.2 G/DL — LOW (ref 12–16)
IRON SATN MFR SERPL: 18 UG/DL — LOW (ref 35–150)
IRON SATN MFR SERPL: 9 % — LOW (ref 15–50)
MCHC RBC-ENTMCNC: 29.3 PG — SIGNIFICANT CHANGE UP (ref 27–31)
MCHC RBC-ENTMCNC: 33.3 G/DL — SIGNIFICANT CHANGE UP (ref 32–37)
MCV RBC AUTO: 87.9 FL — SIGNIFICANT CHANGE UP (ref 81–99)
NRBC # BLD: 0 /100 WBCS — SIGNIFICANT CHANGE UP (ref 0–0)
PLATELET # BLD AUTO: 193 K/UL — SIGNIFICANT CHANGE UP (ref 130–400)
PMV BLD: 9.7 FL — SIGNIFICANT CHANGE UP (ref 7.4–10.4)
POTASSIUM SERPL-MCNC: 4.4 MMOL/L — SIGNIFICANT CHANGE UP (ref 3.5–5)
POTASSIUM SERPL-SCNC: 4.4 MMOL/L — SIGNIFICANT CHANGE UP (ref 3.5–5)
RBC # BLD: 2.8 M/UL — LOW (ref 4.2–5.4)
RBC # FLD: 16.2 % — HIGH (ref 11.5–14.5)
SODIUM SERPL-SCNC: 139 MMOL/L — SIGNIFICANT CHANGE UP (ref 135–146)
TIBC SERPL-MCNC: 200 UG/DL — LOW (ref 220–430)
UIBC SERPL-MCNC: 182 UG/DL — SIGNIFICANT CHANGE UP (ref 110–370)
WBC # BLD: 6.15 K/UL — SIGNIFICANT CHANGE UP (ref 4.8–10.8)
WBC # FLD AUTO: 6.15 K/UL — SIGNIFICANT CHANGE UP (ref 4.8–10.8)

## 2024-03-12 PROCEDURE — 99239 HOSP IP/OBS DSCHRG MGMT >30: CPT

## 2024-03-12 RX ORDER — DOXAZOSIN MESYLATE 4 MG
1 TABLET ORAL
Qty: 0 | Refills: 0 | DISCHARGE
Start: 2024-03-12

## 2024-03-12 RX ORDER — SIMVASTATIN 20 MG/1
1 TABLET, FILM COATED ORAL
Refills: 0 | DISCHARGE

## 2024-03-12 RX ORDER — SIMVASTATIN 20 MG/1
1 TABLET, FILM COATED ORAL
Qty: 0 | Refills: 0 | DISCHARGE
Start: 2024-03-12

## 2024-03-12 RX ORDER — DOXAZOSIN MESYLATE 4 MG
1 TABLET ORAL
Refills: 0 | DISCHARGE

## 2024-03-12 RX ORDER — PIOGLITAZONE HYDROCHLORIDE 15 MG/1
1 TABLET ORAL
Qty: 0 | Refills: 0 | DISCHARGE

## 2024-03-12 RX ORDER — AMLODIPINE BESYLATE 2.5 MG/1
1 TABLET ORAL
Qty: 0 | Refills: 0 | DISCHARGE

## 2024-03-12 RX ORDER — IRON SUCROSE 20 MG/ML
200 INJECTION, SOLUTION INTRAVENOUS ONCE
Refills: 0 | Status: COMPLETED | OUTPATIENT
Start: 2024-03-12 | End: 2024-03-12

## 2024-03-12 RX ADMIN — IRON SUCROSE 110 MILLIGRAM(S): 20 INJECTION, SOLUTION INTRAVENOUS at 10:45

## 2024-03-12 RX ADMIN — Medication 3: at 12:12

## 2024-03-12 RX ADMIN — Medication 50 MILLIGRAM(S): at 05:29

## 2024-03-12 RX ADMIN — Medication 650 MILLIGRAM(S): at 05:28

## 2024-03-12 RX ADMIN — HEPARIN SODIUM 5000 UNIT(S): 5000 INJECTION INTRAVENOUS; SUBCUTANEOUS at 05:29

## 2024-03-12 RX ADMIN — AMLODIPINE BESYLATE 5 MILLIGRAM(S): 2.5 TABLET ORAL at 05:29

## 2024-03-12 RX ADMIN — Medication 1 MILLIGRAM(S): at 05:28

## 2024-03-12 NOTE — CHART NOTE - NSCHARTNOTEFT_GEN_A_CORE
Renal/Bladder US with post void residual ordered as recommended by Nephrology for worsening renal function Renal/Bladder US with post void residual ordered as recommended by Nephrology for worsening renal function    Addendum: Patient makes urine. I spoke to the Son, and patient will follow up with her Nephrologist and inform him/her of recommendation for Renal/Bladder US with post void residual upon discharge.

## 2024-03-12 NOTE — DISCHARGE NOTE PROVIDER - NSDCMRMEDTOKEN_GEN_ALL_CORE_FT
amLODIPine 5 mg oral tablet: 1 tab(s) orally once a day  Basaglar KwikPen 100 units/mL subcutaneous solution: 10 unit(s) subcutaneous once a day  doxazosin 1 mg oral tablet: 1 tab(s) orally once a day  metoprolol tartrate 50 mg oral tablet: 1 tab(s) orally 2 times a day  pioglitazone 30 mg oral tablet: 1 tab(s) orally once a day  simvastatin 20 mg oral tablet: 1 tab(s) orally once a day (at bedtime)  sodium bicarbonate 650 mg oral tablet: 1 tab(s) orally 2 times a day

## 2024-03-12 NOTE — DISCHARGE NOTE NURSING/CASE MANAGEMENT/SOCIAL WORK - PATIENT PORTAL LINK FT
You can access the FollowMyHealth Patient Portal offered by [facility] by registering at the following website: [email or web address]. By joining [facility]’s FollowMyHealth portal, you will also be able to view your health information using other applications (apps) compatible with our system.

## 2024-03-12 NOTE — DISCHARGE NOTE NURSING/CASE MANAGEMENT/SOCIAL WORK - NSDCPEFALRISK_GEN_ALL_CORE
For information on Fall & Injury Prevention, visit: [email or web address] OR  [email or web address] OR  https://www.cdc.gov/steadi/patient.html

## 2024-03-12 NOTE — DISCHARGE NOTE PROVIDER - NSDCCPCAREPLAN_GEN_ALL_CORE_FT
PRINCIPAL DISCHARGE DIAGNOSIS  Diagnosis: Chest pain  Assessment and Plan of Treatment: Acute cardiac event ruled out, you had a normal nuclear medicine stress test, EF 65%, follow up with your Primary care MD in 2 weeks      SECONDARY DISCHARGE DIAGNOSES  Diagnosis: Acute-on-chronic kidney injury  Assessment and Plan of Treatment: You had a slight rise in Creatinine 2.4  to 2.8 on discharge,  follow up with you Nephrologist regarding recommended Renal Bladder US with post void residual.

## 2024-03-12 NOTE — DISCHARGE NOTE PROVIDER - CARE PROVIDER_API CALL
GLENYS KUMAR  [address]  [place] [address]  Phone: ()-  Fax: ()-  Follow Up Time: 2 weeks    Nephrologist,   Phone: (   )    -  Fax: (   )    -  Follow Up Time: 1-3 days

## 2024-03-12 NOTE — DISCHARGE NOTE PROVIDER - HOSPITAL COURSE
This is an 83 yo female with PMHx of HTN, HLD, DM, CKD4 presenting to the ED for chest pain.    Plan:   #Chest Pain- R/O ACS  -Admit to medicine   -s/p  x1 in ED   -EKG: NSR, no ischemic changes   -Trend tronopin 36>36>50  -Cardiology consulted,  lexiscan : normal study, EF65%  -Supplemental oxygen supplied prn   -Pain control prn     #HTN/HLD  -C/w home amlodipine, simvastatin, metoprolol, cardura    #DM2  -ISS while inpatient  -home Basaglar 10 u qd   -holding home Pioglitazone : restarted upon discharge     #CKD4  -c/w sodium bicarb   -renal function worsening, consulted Nephrology: I spoke to Son and the patient will speak to her Nephrologist regarding recommendation of Renal/Bladder US with post void residual as outpatient. Patient does make urine.  Creat   2.4-  3.0-    3.2-         **2.8 presently    This is an 83 yo female with PMHx of HTN, HLD, DM, CKD4 presenting to the ED for chest pain.    #Hyperkalemia  -Lokelma ordered ; K 4.4 on discharge     This is an 83 yo female with PMHx of HTN, HLD, DM, CKD4 presenting to the ED for chest pain.    Plan:   #Chest Pain- R/O ACS  -Admitted to medicine   -s/p  x1 in ED   -EKG: NSR, no ischemic changes   -Trend tronopin 36>36>50  -Cardiology consulted,  underwent lexiscan stress test: normal study, EF 65%  -Supplemental oxygen supplied prn   -Pain control prn     #HTN/HLD  -C/w home amlodipine, simvastatin, metoprolol, cardura    #DM2  -ISS while inpatient  -home Basaglar 10 u qd   -holding home Pioglitazone : restarted upon discharge     #CKD4  -c/w sodium bicarb   -renal function was worsening while admitted which improved with IVF, Nephrology was consulted, was found to have proteinuria likely 2/2 DM recommended outpatient Nephrology workup. I spoke to Son and the patient will speak to her Nephrologist regarding recommendation of Renal/Bladder US with post void residual as outpatient. Patient does make urine.  Creat   2.4-  3.0-    3.2-         **2.8 presently    #Hyperkalemia  -Lokelma ordered; K 4.4 on discharge    #Anemia of CKD  -Hgb stable in the 8s  -Pt was given iron transfusion and advised to f/u with Nephrology after discharge

## 2024-03-12 NOTE — CONSULT NOTE ADULT - ASSESSMENT
ANGELINA on CKD 4, no obvious cause, was not iso hypotension, contrast  Recent creat 2.1 on 2/1/24, p/w creat 2.4 on 3/9  non oliguric  creat down-trending with iv hydration  can d/c iv fluids now / avoid hypervolemia  check renal/bladder ultrasound with pvr   proteinuria noted on UA, pt with long hx of DM uncontrolled, likely d/t DKD-- further w/u and management can be done outpt (should be on RAAS blockade, sglt2i if GFR allows)  please document urine output if possible   cont po sodium bicarb  BP stable, controlled, on amlodipine/doxazosin  p/w chest pain, r/o ACS, resolved, s/p lexiscan, cardio following, TTE reviewed     ANGELINA on CKD 4, no obvious cause, was not iso hypotension, contrast  p/w chest pain, r/o ACS, resolved, s/p lexiscan, cardio following, TTE reviewed  Recent creat 2.1 on 2/1/24, p/w creat 2.4 on 3/9  non oliguric  creat down-trending with iv hydration  can d/c iv fluids now / avoid hypervolemia  check renal/bladder ultrasound with pvr   proteinuria noted on UA, pt with long hx of DM uncontrolled, likely d/t DKD-- further w/u and management can be done outpt (should be on RAAS blockade, sglt2i if GFR allows)  please document urine output if possible   cont po sodium bicarb  phos level noted ok  normocytic anemia, iv iron was infusing while I saw pt, ?iron stores  BP stable, controlled, on amlodipine/doxazosin

## 2024-03-12 NOTE — CONSULT NOTE ADULT - SUBJECTIVE AND OBJECTIVE BOX
NEPHROLOGY CONSULTATION NOTE    MERLENE GARCIA  84y  Female  MRN-[id number]    CC:   Patient is a 84y old  Female who presents with a chief complaint of chest pain (11 Mar 2024 12:50)      HPI:  This is an 85 yo female with PMHx of HTN, HLD, DM, CKD4 presenting to the ED for chest pain. Patient's son reports pain started at midnight in left shoulder, describes pain is persistent, aching, nonradiating. Patient admits to associated SOB and orthopnea, and states pain is alleviated when sitting/standing up.  Patient also reports mild dull HA, 5/10. Patient currently reports chest pain has significantly improved.  Denies numbness/tingling, fever, chills, abdominal pain, dysuria, V/D, palpitations, lightheadedness, dizziness, weakness at this time.  (09 Mar 2024 11:06)      PAST MEDICAL & SURGICAL HISTORY:  HTN (hypertension)      HLD (hyperlipidemia)      DM (diabetes mellitus)      No significant past surgical history        Allergies:  No Known Allergies    Home Medications Reviewed  Hospital Medications:   MEDICATIONS  (STANDING):  amLODIPine   Tablet 5 milliGRAM(s) Oral daily  chlorhexidine 2% Cloths 1 Application(s) Topical <User Schedule>  doxazosin 1 milliGRAM(s) Oral daily  heparin   Injectable 5000 Unit(s) SubCutaneous every 8 hours  insulin glargine Injectable (LANTUS) 10 Unit(s) SubCutaneous at bedtime  insulin lispro (ADMELOG) corrective regimen sliding scale   SubCutaneous three times a day before meals  metoprolol tartrate 50 milliGRAM(s) Oral two times a day  regadenoson Injectable 0.4 milliGRAM(s) IV Push once  simvastatin 20 milliGRAM(s) Oral at bedtime  sodium bicarbonate 650 milliGRAM(s) Oral two times a day  sodium chloride 0.9%. 1000 milliLiter(s) (100 mL/Hr) IV Continuous <Continuous>    MEDICATIONS  (PRN):  acetaminophen     Tablet .. 650 milliGRAM(s) Oral every 6 hours PRN Temp greater or equal to 38C (100.4F), Mild Pain (1 - 3)  melatonin 3 milliGRAM(s) Oral at bedtime PRN Insomnia  ondansetron Injectable 4 milliGRAM(s) IV Push every 8 hours PRN Nausea and/or Vomiting    Home Medications:  amLODIPine 5 mg oral tablet: 1 tab(s) orally (09 Mar 2024 10:52)  Basaglar KwikPen 100 units/mL subcutaneous solution: 10 unit(s) subcutaneous once a day (09 Mar 2024 11:15)  doxazosin 1 mg oral tablet: 1 tab(s) orally (09 Mar 2024 10:52)  metoprolol tartrate 50 mg oral tablet: 1 tab(s) orally 2 times a day (09 Mar 2024 11:49)  pioglitazone 30 mg oral tablet: 1 tab(s) orally (09 Mar 2024 10:52)  simvastatin 20 mg oral tablet: 1 tab(s) orally (09 Mar 2024 10:52)  sodium bicarbonate 650 mg oral tablet: 1 tab(s) orally 2 times a day (09 Mar 2024 11:46)      SOCIAL HISTORY:  Social History:  Denies alcohol and tobacco use. (09 Mar 2024 11:06)      FAMILY HISTORY:      REVIEW OF SYSTEMS:  denies chest pain  denies SOB  denies dysuria  denies LE swelling  All other review of systems is negative unless indicated above.    VITALS:  T(F): 98.4 (03-12-24 @ 04:30), Max: 98.8 (03-11-24 @ 20:10)  HR: 91 (03-12-24 @ 04:30)  BP: 138/65 (03-12-24 @ 04:30)  RR: 17 (03-12-24 @ 04:30)  SpO2: 90% (03-12-24 @ 04:30)      I&O's Detail        I&O's Summary      PHYSICAL EXAM:  Gen: NAD  resp: b/l breath sounds  abd: soft  ext: no edema    LABS:      03-12    139  |  104  |  65<HH>  ----------------------------<  132<H>  4.4   |  22  |  2.8<H>    Ca    8.2<L>      12 Mar 2024 06:29  Phos  4.1     03-11  Mg     2.1     03-11      Creatinine Trend:   Creatinine: 2.8 mg/dL (03-12-24 @ 06:29)  Creatinine: 3.2 mg/dL (03-11-24 @ 06:54)  Creatinine: 3.0 mg/dL (03-10-24 @ 07:40)  Creatinine: 2.4 mg/dL (03-09-24 @ 07:40)  Creatinine: 2.1 mg/dL (08-07-23 @ 17:48)                            8.2    6.15  )-----------( 193      ( 12 Mar 2024 06:29 )             24.6     Mean Cell Volume: 87.9 fL (03-12-24 @ 06:29)    Urine Studies:  Protein, Urine: 300 mg/dL (03-11-24 @ 15:26)  White Blood Cell - Urine: 2 /HPF (03-11-24 @ 15:26)  Red Blood Cell - Urine: 3 /HPF (03-11-24 @ 15:26)  Protein, Urine: 100 mg/dL (04-18-20 @ 07:38)  White Blood Cell - Urine: 6-10 /HPF (04-18-20 @ 07:38)  Red Blood Cell - Urine: 3-5 /HPF (04-18-20 @ 07:38)  Granular Cast: 1-2 /LPF (04-18-20 @ 07:38)              RADIOLOGY & ADDITIONAL STUDIES:        Xray Chest 1 View- PORTABLE-Urgent:   ACC: [id number] EXAM:  XR CHEST PORTABLE URGENT 1V   ORDERED BY: KURTIS BUTLER     PROCEDURE DATE:  03/09/2024          INTERPRETATION:  Clinical History / Reason for exam: Chest pain    Comparison : Chest radiograph chest radiograph from 4/18/2020.    Technique/Positioning: AP portable.    Findings:    Support devices: None.    Cardiac/mediastinum/hilum: Cannot reliably be evaluated on this   projection.    Lung parenchyma/Pleura: No focal consolidation, pleural effusion,   pneumothorax. Stable increased interstitial lung markings.    Skeleton/soft tissues: Degenerative changes.    Impression:    No focal consolidation, pleural effusion, pneumothorax. Stable increased   interstitial lung markings.    --- End of Report ---            TONA BAZAN MD; Attending Radiologist  This document has been electronically signed. Mar  9 2024  3:56PM (03-09-24 @ 08:22)

## 2024-03-12 NOTE — DISCHARGE NOTE PROVIDER - PROVIDER TOKENS
PROVIDER:[TOKEN:[[id number]],FOLLOWUP:[2 weeks]],FREE:[LAST:[Nephrologist],PHONE:[(   )    -],FAX:[(   )    -],FOLLOWUP:[1-3 days]]

## 2024-03-19 DIAGNOSIS — Z79.4 LONG TERM (CURRENT) USE OF INSULIN: ICD-10-CM

## 2024-03-19 DIAGNOSIS — E11.22 TYPE 2 DIABETES MELLITUS WITH DIABETIC CHRONIC KIDNEY DISEASE: ICD-10-CM

## 2024-03-19 DIAGNOSIS — R07.9 CHEST PAIN, UNSPECIFIED: ICD-10-CM

## 2024-03-19 DIAGNOSIS — I12.9 HYPERTENSIVE CHRONIC KIDNEY DISEASE WITH STAGE 1 THROUGH STAGE 4 CHRONIC KIDNEY DISEASE, OR UNSPECIFIED CHRONIC KIDNEY DISEASE: ICD-10-CM

## 2024-03-19 DIAGNOSIS — E87.5 HYPERKALEMIA: ICD-10-CM

## 2024-03-19 DIAGNOSIS — D63.1 ANEMIA IN CHRONIC KIDNEY DISEASE: ICD-10-CM

## 2024-03-19 DIAGNOSIS — N18.4 CHRONIC KIDNEY DISEASE, STAGE 4 (SEVERE): ICD-10-CM

## 2024-03-19 DIAGNOSIS — E11.65 TYPE 2 DIABETES MELLITUS WITH HYPERGLYCEMIA: ICD-10-CM

## 2024-03-19 DIAGNOSIS — N17.9 ACUTE KIDNEY FAILURE, UNSPECIFIED: ICD-10-CM

## 2024-11-14 NOTE — DISCHARGE NOTE PROVIDER - NSDCACTIVITY_GEN_ALL_CORE
No restrictions Dapsone Counseling: I discussed with the patient the risks of dapsone including but not limited to hemolytic anemia, agranulocytosis, rashes, methemoglobinemia, kidney failure, peripheral neuropathy, headaches, GI upset, and liver toxicity.  Patients who start dapsone require monitoring including baseline LFTs and weekly CBCs for the first month, then every month thereafter.  The patient verbalized understanding of the proper use and possible adverse effects of dapsone.  All of the patient's questions and concerns were addressed.